# Patient Record
Sex: MALE | Race: WHITE | NOT HISPANIC OR LATINO | Employment: OTHER | URBAN - METROPOLITAN AREA
[De-identification: names, ages, dates, MRNs, and addresses within clinical notes are randomized per-mention and may not be internally consistent; named-entity substitution may affect disease eponyms.]

---

## 2024-06-05 ENCOUNTER — OFFICE VISIT (OUTPATIENT)
Dept: MEDICAL GROUP | Facility: MEDICAL CENTER | Age: 64
End: 2024-06-05
Payer: MEDICARE

## 2024-06-05 ENCOUNTER — APPOINTMENT (OUTPATIENT)
Dept: RADIOLOGY | Facility: MEDICAL CENTER | Age: 64
End: 2024-06-05
Attending: PHYSICIAN ASSISTANT
Payer: MEDICARE

## 2024-06-05 VITALS
HEART RATE: 61 BPM | DIASTOLIC BLOOD PRESSURE: 50 MMHG | OXYGEN SATURATION: 94 % | WEIGHT: 267.2 LBS | BODY MASS INDEX: 33.22 KG/M2 | SYSTOLIC BLOOD PRESSURE: 130 MMHG | RESPIRATION RATE: 16 BRPM | TEMPERATURE: 96.9 F | HEIGHT: 75 IN

## 2024-06-05 DIAGNOSIS — Z85.828 HISTORY OF SKIN CANCER: ICD-10-CM

## 2024-06-05 DIAGNOSIS — Z12.5 SCREENING PSA (PROSTATE SPECIFIC ANTIGEN): ICD-10-CM

## 2024-06-05 DIAGNOSIS — R07.81 RIB PAIN ON RIGHT SIDE: ICD-10-CM

## 2024-06-05 DIAGNOSIS — J30.2 SEASONAL ALLERGIES: ICD-10-CM

## 2024-06-05 DIAGNOSIS — R73.09 ELEVATED GLUCOSE: ICD-10-CM

## 2024-06-05 DIAGNOSIS — E78.00 ELEVATED CHOLESTEROL: ICD-10-CM

## 2024-06-05 DIAGNOSIS — I48.20 CHRONIC ATRIAL FIBRILLATION (HCC): ICD-10-CM

## 2024-06-05 PROCEDURE — 3078F DIAST BP <80 MM HG: CPT | Performed by: PHYSICIAN ASSISTANT

## 2024-06-05 PROCEDURE — 71101 X-RAY EXAM UNILAT RIBS/CHEST: CPT | Mod: RT

## 2024-06-05 PROCEDURE — 3075F SYST BP GE 130 - 139MM HG: CPT | Performed by: PHYSICIAN ASSISTANT

## 2024-06-05 PROCEDURE — 99204 OFFICE O/P NEW MOD 45 MIN: CPT | Performed by: PHYSICIAN ASSISTANT

## 2024-06-05 RX ORDER — AZELASTINE 1 MG/ML
2 SPRAY, METERED NASAL 2 TIMES DAILY
COMMUNITY

## 2024-06-05 RX ORDER — APIXABAN 5 MG/1
5 TABLET, FILM COATED ORAL 2 TIMES DAILY
COMMUNITY
Start: 2024-05-09

## 2024-06-05 RX ORDER — AMIODARONE HYDROCHLORIDE 200 MG/1
100 TABLET ORAL
COMMUNITY

## 2024-06-05 RX ORDER — FLUTICASONE PROPIONATE 50 MCG
1 SPRAY, SUSPENSION (ML) NASAL 2 TIMES DAILY
COMMUNITY

## 2024-06-05 RX ORDER — METOPROLOL SUCCINATE 100 MG/1
100 TABLET, EXTENDED RELEASE ORAL 2 TIMES DAILY
COMMUNITY

## 2024-06-05 RX ORDER — AMIODARONE HYDROCHLORIDE 200 MG/1
200 TABLET ORAL
COMMUNITY
Start: 2024-05-10 | End: 2024-06-05

## 2024-06-05 ASSESSMENT — PATIENT HEALTH QUESTIONNAIRE - PHQ9: CLINICAL INTERPRETATION OF PHQ2 SCORE: 0

## 2024-06-05 NOTE — PROGRESS NOTES
Subjective:     History of Present Illness  The patient presents today to establish care.    The patient has been grappling with atrial fibrillation for a duration of 30 years, which has been well-managed with metoprolol. He underwent an ablation procedure in 11/2023, performed by Dr. Chappell, which yielded successful results. A second ablation was planned, however, his heart returned to normal sinus rhythm. His current medication regimen includes metoprolol, taken twice daily, amiodarone with half a tablet thrice weekly on Monday, Wednesday, and Friday. His primary cardiologist, Dr. River, has been renewed by [facility] in [place].    The patient experiences sinusitis approximately every 5 years, which he attributes to a david environment during his recent move. He is currently using Astelin nasal sprays for seasonal allergies.    The patient expresses a desire to consult a dermatologist due to a history of basal cell carcinoma.    The patient reports experiencing back pain, which he describes as a sensation of being struck in the back. He denies any trauma to the area. The pain has shown improvement over the past 2 weeks.    Supplemental Information  He had a colonoscopy in 2023, which revealed a couple of benign polyps. He has been screened for hepatitis C in the past.      Current medicines (including changes today)  Current Outpatient Medications   Medication Sig Dispense Refill    ELIQUIS 5 MG Tab Take 5 mg by mouth 2 times a day.      azelastine (ASTELIN) 137 MCG/SPRAY nasal spray Administer 2 Sprays into affected nostril(S) 2 times a day.      fluticasone (FLONASE) 50 MCG/ACT nasal spray Administer 1 Spray into affected nostril(S) 2 times a day.      metoprolol SR (TOPROL XL) 100 MG TABLET SR 24 HR Take 100 mg by mouth 2 times a day.      amiodarone (CORDARONE) 200 MG Tab Take 100 mg by mouth every day. TID weekly  Indications: Atrial Fibrillation       No current facility-administered medications for  "this visit.     He  has no past medical history on file.    ROS   No shortness of breath, no abdominal pain  Positive ROS as per HPI.  All other systems reviewed and are negative.     Objective:     /50 (BP Location: Left arm, Patient Position: Sitting, BP Cuff Size: Adult)   Pulse 61   Temp 36.1 °C (96.9 °F) (Temporal)   Resp 16   Ht 1.905 m (6' 3\")   Wt 121 kg (267 lb 3.2 oz)   SpO2 94%  Body mass index is 33.4 kg/m².   Physical Exam    Constitutional: Alert, no distress.  Skin: Warm, dry, good turgor, no rashes in visible areas.  Eye: Equal, round and reactive, conjunctiva clear, lids normal.  ENMT: Lips without lesions, good dentition, oropharynx clear.  Neck: Trachea midline, no masses, no thyromegaly.   Psych: Alert and oriented x3, normal affect and mood.      Results          Assessment and Plan:   The following treatment plan was discussed    Assessment & Plan  1. Chronic atrial fibrillation.  The patient's condition remains stable. A referral to cardiology has been made to establish care. The patient is advised to continue with amiodarone 100 mg thrice weekly and Eliquis 5 mg twice daily. Prescriptions have been deferred to cardiology.    2. Seasonal allergies.  The patient is advised to continue with Astelin and Flonase.    3. History of skin cancer.  A referral to dermatology has been made.    4. Acute right-sided rib pain, atraumatic.  An x-ray has been ordered.    5. Screening purposes.  Labs for screening purposes have been ordered. The patient will be contacted with the results.    Follow-up  A follow-up appointment is scheduled for 6 months from now.      ORDERS:  1. Chronic atrial fibrillation (HCC)    - REFERRAL TO CARDIOLOGY    2. Seasonal allergies      3. History of skin cancer    - Referral to Dermatology    4. Rib pain on right side    - [id number] (WITH 1-VIEW CXR) RIGHT; Future    5. Elevated cholesterol    - CBC WITH DIFFERENTIAL; Future  - Comp Metabolic Panel; " Future  - Lipid Profile; Future    6. Elevated glucose    - HEMOGLOBIN A1C; Future    7. Screening PSA (prostate specific antigen)    - PROSTATE SPECIFIC AG SCREENING; Future        Please note that this dictation was created using voice recognition software. I have made every reasonable attempt to correct obvious errors, but I expect that there are errors of grammar and possibly content that I did not discover before finalizing the note.      Attestation      Verbal consent was acquired by the patient to use [email or web address] ambient listening note generation during this visit Yes

## 2024-06-07 ENCOUNTER — TELEPHONE (OUTPATIENT)
Dept: MEDICAL GROUP | Facility: MEDICAL CENTER | Age: 64
End: 2024-06-07
Payer: MEDICARE

## 2024-06-07 NOTE — TELEPHONE ENCOUNTER
Phone Number Called: [phone]    Call outcome: Spoke to patient regarding message below.    Message: Pt was contacted in regards to lab x-ray results. Pt had no questions or concerns.

## 2024-06-07 NOTE — TELEPHONE ENCOUNTER
----- Message from Leoncio Grider P.A.-C. sent at 6/7/2024  6:57 AM PDT -----  Please contact Yunior.  X-ray good.  Will keep an eye on the pain of that side of the rib.  Please contact me with any questions.    Leoncio

## 2024-06-24 ENCOUNTER — HOSPITAL ENCOUNTER (OUTPATIENT)
Dept: LAB | Facility: MEDICAL CENTER | Age: 64
End: 2024-06-24
Attending: PHYSICIAN ASSISTANT
Payer: MEDICARE

## 2024-06-24 DIAGNOSIS — E78.00 ELEVATED CHOLESTEROL: ICD-10-CM

## 2024-06-24 DIAGNOSIS — R73.09 ELEVATED GLUCOSE: ICD-10-CM

## 2024-06-24 DIAGNOSIS — Z12.5 SCREENING PSA (PROSTATE SPECIFIC ANTIGEN): ICD-10-CM

## 2024-06-24 LAB
ALBUMIN SERPL BCP-MCNC: 4.3 G/DL (ref 3.2–4.9)
ALBUMIN/GLOB SERPL: 1.5 G/DL
ALP SERPL-CCNC: 116 U/L (ref 30–99)
ALT SERPL-CCNC: 32 U/L (ref 2–50)
ANION GAP SERPL CALC-SCNC: 11 MMOL/L (ref 7–16)
AST SERPL-CCNC: 42 U/L (ref 12–45)
BASOPHILS # BLD AUTO: 0.7 % (ref 0–1.8)
BASOPHILS # BLD: 0.05 K/UL (ref 0–0.12)
BILIRUB SERPL-MCNC: 1.6 MG/DL (ref 0.1–1.5)
BUN SERPL-MCNC: 11 MG/DL (ref 8–22)
CALCIUM ALBUM COR SERPL-MCNC: 8.9 MG/DL (ref 8.5–10.5)
CALCIUM SERPL-MCNC: 9.1 MG/DL (ref 8.5–10.5)
CHLORIDE SERPL-SCNC: 105 MMOL/L (ref 96–112)
CHOLEST SERPL-MCNC: 195 MG/DL (ref 100–199)
CO2 SERPL-SCNC: 24 MMOL/L (ref 20–33)
CREAT SERPL-MCNC: 0.79 MG/DL (ref 0.5–1.4)
EOSINOPHIL # BLD AUTO: 0.12 K/UL (ref 0–0.51)
EOSINOPHIL NFR BLD: 1.7 % (ref 0–6.9)
ERYTHROCYTE [DISTWIDTH] IN BLOOD BY AUTOMATED COUNT: 44.4 FL (ref 35.9–50)
EST. AVERAGE GLUCOSE BLD GHB EST-MCNC: 103 MG/DL
GFR SERPLBLD CREATININE-BSD FMLA CKD-EPI: 98 ML/MIN/1.73 M 2
GLOBULIN SER CALC-MCNC: 2.9 G/DL (ref 1.9–3.5)
GLUCOSE SERPL-MCNC: 101 MG/DL (ref 65–99)
HBA1C MFR BLD: 5.2 % (ref 4–5.6)
HCT VFR BLD AUTO: 48.8 % (ref 42–52)
HDLC SERPL-MCNC: 46 MG/DL
HGB BLD-MCNC: 16.4 G/DL (ref 14–18)
IMM GRANULOCYTES # BLD AUTO: 0.02 K/UL (ref 0–0.11)
IMM GRANULOCYTES NFR BLD AUTO: 0.3 % (ref 0–0.9)
LDLC SERPL CALC-MCNC: 120 MG/DL
LYMPHOCYTES # BLD AUTO: 2.12 K/UL (ref 1–4.8)
LYMPHOCYTES NFR BLD: 30.6 % (ref 22–41)
MCH RBC QN AUTO: 32.2 PG (ref 27–33)
MCHC RBC AUTO-ENTMCNC: 33.6 G/DL (ref 32.3–36.5)
MCV RBC AUTO: 95.7 FL (ref 81.4–97.8)
MONOCYTES # BLD AUTO: 0.68 K/UL (ref 0–0.85)
MONOCYTES NFR BLD AUTO: 9.8 % (ref 0–13.4)
NEUTROPHILS # BLD AUTO: 3.94 K/UL (ref 1.82–7.42)
NEUTROPHILS NFR BLD: 56.9 % (ref 44–72)
NRBC # BLD AUTO: 0 K/UL
NRBC BLD-RTO: 0 /100 WBC (ref 0–0.2)
PLATELET # BLD AUTO: 155 K/UL (ref 164–446)
PMV BLD AUTO: 10.4 FL (ref 9–12.9)
POTASSIUM SERPL-SCNC: 4.5 MMOL/L (ref 3.6–5.5)
PROT SERPL-MCNC: 7.2 G/DL (ref 6–8.2)
PSA SERPL-MCNC: 0.21 NG/ML (ref 0–4)
RBC # BLD AUTO: 5.1 M/UL (ref 4.7–6.1)
SODIUM SERPL-SCNC: 140 MMOL/L (ref 135–145)
TRIGL SERPL-MCNC: 147 MG/DL (ref 0–149)
WBC # BLD AUTO: 6.9 K/UL (ref 4.8–10.8)

## 2024-06-24 PROCEDURE — 36415 COLL VENOUS BLD VENIPUNCTURE: CPT

## 2024-06-24 PROCEDURE — 80053 COMPREHEN METABOLIC PANEL: CPT

## 2024-06-24 PROCEDURE — 83036 HEMOGLOBIN GLYCOSYLATED A1C: CPT | Mod: GA

## 2024-06-24 PROCEDURE — 85025 COMPLETE CBC W/AUTO DIFF WBC: CPT

## 2024-06-24 PROCEDURE — 84153 ASSAY OF PSA TOTAL: CPT | Mod: GA

## 2024-06-24 PROCEDURE — 80061 LIPID PANEL: CPT

## 2024-06-25 ENCOUNTER — APPOINTMENT (OUTPATIENT)
Dept: RADIOLOGY | Facility: MEDICAL CENTER | Age: 64
DRG: 281 | End: 2024-06-25
Attending: EMERGENCY MEDICINE
Payer: MEDICARE

## 2024-06-25 ENCOUNTER — APPOINTMENT (OUTPATIENT)
Dept: CARDIOLOGY | Facility: MEDICAL CENTER | Age: 64
DRG: 281 | End: 2024-06-25
Attending: EMERGENCY MEDICINE
Payer: MEDICARE

## 2024-06-25 ENCOUNTER — OFFICE VISIT (OUTPATIENT)
Dept: CARDIOLOGY | Facility: MEDICAL CENTER | Age: 64
End: 2024-06-25
Attending: PHYSICIAN ASSISTANT
Payer: MEDICARE

## 2024-06-25 ENCOUNTER — HOSPITAL ENCOUNTER (INPATIENT)
Facility: MEDICAL CENTER | Age: 64
LOS: 1 days | DRG: 281 | End: 2024-06-26
Attending: EMERGENCY MEDICINE | Admitting: HOSPITALIST
Payer: MEDICARE

## 2024-06-25 VITALS
BODY MASS INDEX: 33.57 KG/M2 | HEIGHT: 75 IN | WEIGHT: 270 LBS | HEART RATE: 134 BPM | DIASTOLIC BLOOD PRESSURE: 68 MMHG | RESPIRATION RATE: 14 BRPM | SYSTOLIC BLOOD PRESSURE: 112 MMHG | OXYGEN SATURATION: 100 %

## 2024-06-25 DIAGNOSIS — E78.00 PURE HYPERCHOLESTEROLEMIA: ICD-10-CM

## 2024-06-25 DIAGNOSIS — I48.0 PAROXYSMAL ATRIAL FIBRILLATION (HCC): ICD-10-CM

## 2024-06-25 DIAGNOSIS — I48.0 HYPERCOAGULABLE STATE DUE TO PAROXYSMAL ATRIAL FIBRILLATION (HCC): ICD-10-CM

## 2024-06-25 DIAGNOSIS — D68.69 HYPERCOAGULABLE STATE DUE TO PAROXYSMAL ATRIAL FIBRILLATION (HCC): ICD-10-CM

## 2024-06-25 DIAGNOSIS — I10 HTN (HYPERTENSION), MALIGNANT: ICD-10-CM

## 2024-06-25 DIAGNOSIS — I48.91 ATRIAL FIBRILLATION WITH RVR (HCC): ICD-10-CM

## 2024-06-25 DIAGNOSIS — R07.89 OTHER CHEST PAIN: ICD-10-CM

## 2024-06-25 DIAGNOSIS — R79.89 ELEVATED TROPONIN: ICD-10-CM

## 2024-06-25 DIAGNOSIS — I47.10 SVT (SUPRAVENTRICULAR TACHYCARDIA) (HCC): ICD-10-CM

## 2024-06-25 PROBLEM — R94.31 ABNORMAL EKG: Status: ACTIVE | Noted: 2024-06-25

## 2024-06-25 LAB
ALBUMIN SERPL BCP-MCNC: 4.2 G/DL (ref 3.2–4.9)
ALBUMIN/GLOB SERPL: 1.4 G/DL
ALP SERPL-CCNC: 115 U/L (ref 30–99)
ALT SERPL-CCNC: 29 U/L (ref 2–50)
ANION GAP SERPL CALC-SCNC: 13 MMOL/L (ref 7–16)
APTT PPP: 33.8 SEC (ref 24.7–36)
AST SERPL-CCNC: 40 U/L (ref 12–45)
BASOPHILS # BLD AUTO: 0.6 % (ref 0–1.8)
BASOPHILS # BLD: 0.05 K/UL (ref 0–0.12)
BILIRUB SERPL-MCNC: 1.1 MG/DL (ref 0.1–1.5)
BUN SERPL-MCNC: 11 MG/DL (ref 8–22)
CALCIUM ALBUM COR SERPL-MCNC: 9.3 MG/DL (ref 8.5–10.5)
CALCIUM SERPL-MCNC: 9.5 MG/DL (ref 8.5–10.5)
CHLORIDE SERPL-SCNC: 106 MMOL/L (ref 96–112)
CO2 SERPL-SCNC: 20 MMOL/L (ref 20–33)
CREAT SERPL-MCNC: 0.76 MG/DL (ref 0.5–1.4)
EKG IMPRESSION: NORMAL
EOSINOPHIL # BLD AUTO: 0.11 K/UL (ref 0–0.51)
EOSINOPHIL NFR BLD: 1.4 % (ref 0–6.9)
ERYTHROCYTE [DISTWIDTH] IN BLOOD BY AUTOMATED COUNT: 43.8 FL (ref 35.9–50)
GFR SERPLBLD CREATININE-BSD FMLA CKD-EPI: 100 ML/MIN/1.73 M 2
GLOBULIN SER CALC-MCNC: 2.9 G/DL (ref 1.9–3.5)
GLUCOSE SERPL-MCNC: 83 MG/DL (ref 65–99)
HCT VFR BLD AUTO: 46.9 % (ref 42–52)
HGB BLD-MCNC: 16.6 G/DL (ref 14–18)
IMM GRANULOCYTES # BLD AUTO: 0.03 K/UL (ref 0–0.11)
IMM GRANULOCYTES NFR BLD AUTO: 0.4 % (ref 0–0.9)
INR PPP: 1.33 (ref 0.87–1.13)
LV EJECT FRACT  99904: 60
LV EJECT FRACT MOD 2C 99903: 52.02
LV EJECT FRACT MOD 4C 99902: 46.7
LV EJECT FRACT MOD BP 99901: 51.75
LYMPHOCYTES # BLD AUTO: 2.5 K/UL (ref 1–4.8)
LYMPHOCYTES NFR BLD: 31.1 % (ref 22–41)
MAGNESIUM SERPL-MCNC: 2.1 MG/DL (ref 1.5–2.5)
MCH RBC QN AUTO: 32.8 PG (ref 27–33)
MCHC RBC AUTO-ENTMCNC: 35.4 G/DL (ref 32.3–36.5)
MCV RBC AUTO: 92.7 FL (ref 81.4–97.8)
MONOCYTES # BLD AUTO: 0.9 K/UL (ref 0–0.85)
MONOCYTES NFR BLD AUTO: 11.2 % (ref 0–13.4)
NEUTROPHILS # BLD AUTO: 4.44 K/UL (ref 1.82–7.42)
NEUTROPHILS NFR BLD: 55.3 % (ref 44–72)
NRBC # BLD AUTO: 0 K/UL
NRBC BLD-RTO: 0 /100 WBC (ref 0–0.2)
PHOSPHATE SERPL-MCNC: 2.8 MG/DL (ref 2.5–4.5)
PLATELET # BLD AUTO: 150 K/UL (ref 164–446)
PMV BLD AUTO: 10 FL (ref 9–12.9)
POTASSIUM SERPL-SCNC: 4.1 MMOL/L (ref 3.6–5.5)
PROT SERPL-MCNC: 7.1 G/DL (ref 6–8.2)
PROTHROMBIN TIME: 16.6 SEC (ref 12–14.6)
RBC # BLD AUTO: 5.06 M/UL (ref 4.7–6.1)
SODIUM SERPL-SCNC: 139 MMOL/L (ref 135–145)
TROPONIN T SERPL-MCNC: 134 NG/L (ref 6–19)
TROPONIN T SERPL-MCNC: 146 NG/L (ref 6–19)
TROPONIN T SERPL-MCNC: 152 NG/L (ref 6–19)
TSH SERPL DL<=0.005 MIU/L-ACNC: 2.54 UIU/ML (ref 0.38–5.33)
WBC # BLD AUTO: 8 K/UL (ref 4.8–10.8)

## 2024-06-25 PROCEDURE — 99204 OFFICE O/P NEW MOD 45 MIN: CPT | Mod: 25 | Performed by: INTERNAL MEDICINE

## 2024-06-25 PROCEDURE — 3074F SYST BP LT 130 MM HG: CPT | Performed by: INTERNAL MEDICINE

## 2024-06-25 PROCEDURE — 83735 ASSAY OF MAGNESIUM: CPT

## 2024-06-25 PROCEDURE — A9270 NON-COVERED ITEM OR SERVICE: HCPCS | Performed by: HOSPITALIST

## 2024-06-25 PROCEDURE — 71045 X-RAY EXAM CHEST 1 VIEW: CPT

## 2024-06-25 PROCEDURE — 96375 TX/PRO/DX INJ NEW DRUG ADDON: CPT

## 2024-06-25 PROCEDURE — 700111 HCHG RX REV CODE 636 W/ 250 OVERRIDE (IP): Performed by: EMERGENCY MEDICINE

## 2024-06-25 PROCEDURE — 99223 1ST HOSP IP/OBS HIGH 75: CPT | Performed by: INTERNAL MEDICINE

## 2024-06-25 PROCEDURE — 700117 HCHG RX CONTRAST REV CODE 255: Performed by: EMERGENCY MEDICINE

## 2024-06-25 PROCEDURE — 93010 ELECTROCARDIOGRAM REPORT: CPT | Performed by: INTERNAL MEDICINE

## 2024-06-25 PROCEDURE — 93306 TTE W/DOPPLER COMPLETE: CPT

## 2024-06-25 PROCEDURE — 700111 HCHG RX REV CODE 636 W/ 250 OVERRIDE (IP): Mod: JZ | Performed by: HOSPITALIST

## 2024-06-25 PROCEDURE — 93005 ELECTROCARDIOGRAM TRACING: CPT | Performed by: EMERGENCY MEDICINE

## 2024-06-25 PROCEDURE — 36415 COLL VENOUS BLD VENIPUNCTURE: CPT

## 2024-06-25 PROCEDURE — 85610 PROTHROMBIN TIME: CPT

## 2024-06-25 PROCEDURE — 93306 TTE W/DOPPLER COMPLETE: CPT | Mod: 26 | Performed by: INTERNAL MEDICINE

## 2024-06-25 PROCEDURE — 84443 ASSAY THYROID STIM HORMONE: CPT

## 2024-06-25 PROCEDURE — 3078F DIAST BP <80 MM HG: CPT | Performed by: INTERNAL MEDICINE

## 2024-06-25 PROCEDURE — 700102 HCHG RX REV CODE 250 W/ 637 OVERRIDE(OP): Performed by: HOSPITALIST

## 2024-06-25 PROCEDURE — 99211 OFF/OP EST MAY X REQ PHY/QHP: CPT | Performed by: INTERNAL MEDICINE

## 2024-06-25 PROCEDURE — 84484 ASSAY OF TROPONIN QUANT: CPT

## 2024-06-25 PROCEDURE — 770020 HCHG ROOM/CARE - TELE (206)

## 2024-06-25 PROCEDURE — 99285 EMERGENCY DEPT VISIT HI MDM: CPT

## 2024-06-25 PROCEDURE — 80053 COMPREHEN METABOLIC PANEL: CPT

## 2024-06-25 PROCEDURE — 85025 COMPLETE CBC W/AUTO DIFF WBC: CPT

## 2024-06-25 PROCEDURE — 93005 ELECTROCARDIOGRAM TRACING: CPT | Performed by: INTERNAL MEDICINE

## 2024-06-25 PROCEDURE — 84100 ASSAY OF PHOSPHORUS: CPT

## 2024-06-25 PROCEDURE — 93005 ELECTROCARDIOGRAM TRACING: CPT

## 2024-06-25 PROCEDURE — 96374 THER/PROPH/DIAG INJ IV PUSH: CPT

## 2024-06-25 PROCEDURE — 99291 CRITICAL CARE FIRST HOUR: CPT | Performed by: HOSPITALIST

## 2024-06-25 PROCEDURE — 700105 HCHG RX REV CODE 258: Performed by: HOSPITALIST

## 2024-06-25 PROCEDURE — 700111 HCHG RX REV CODE 636 W/ 250 OVERRIDE (IP)

## 2024-06-25 PROCEDURE — 85730 THROMBOPLASTIN TIME PARTIAL: CPT

## 2024-06-25 RX ORDER — AMOXICILLIN 250 MG
2 CAPSULE ORAL 2 TIMES DAILY
Status: DISCONTINUED | OUTPATIENT
Start: 2024-06-25 | End: 2024-06-26 | Stop reason: HOSPADM

## 2024-06-25 RX ORDER — HYDROMORPHONE HYDROCHLORIDE 1 MG/ML
0.25 INJECTION, SOLUTION INTRAMUSCULAR; INTRAVENOUS; SUBCUTANEOUS
Status: DISCONTINUED | OUTPATIENT
Start: 2024-06-25 | End: 2024-06-26 | Stop reason: HOSPADM

## 2024-06-25 RX ORDER — OXYCODONE HYDROCHLORIDE 5 MG/1
2.5 TABLET ORAL
Status: DISCONTINUED | OUTPATIENT
Start: 2024-06-25 | End: 2024-06-26 | Stop reason: HOSPADM

## 2024-06-25 RX ORDER — POLYETHYLENE GLYCOL 3350 17 G/17G
1 POWDER, FOR SOLUTION ORAL
Status: DISCONTINUED | OUTPATIENT
Start: 2024-06-25 | End: 2024-06-26 | Stop reason: HOSPADM

## 2024-06-25 RX ORDER — METOPROLOL SUCCINATE 50 MG/1
100 TABLET, EXTENDED RELEASE ORAL 2 TIMES DAILY
Status: DISCONTINUED | OUTPATIENT
Start: 2024-06-25 | End: 2024-06-25

## 2024-06-25 RX ORDER — AMIODARONE HYDROCHLORIDE 200 MG/1
100 TABLET ORAL
Status: DISCONTINUED | OUTPATIENT
Start: 2024-06-26 | End: 2024-06-26 | Stop reason: HOSPADM

## 2024-06-25 RX ORDER — DILTIAZEM HYDROCHLORIDE 5 MG/ML
10 INJECTION INTRAVENOUS ONCE
Status: COMPLETED | OUTPATIENT
Start: 2024-06-25 | End: 2024-06-25

## 2024-06-25 RX ORDER — ACETAMINOPHEN 325 MG/1
650 TABLET ORAL EVERY 6 HOURS PRN
Status: DISCONTINUED | OUTPATIENT
Start: 2024-06-25 | End: 2024-06-26 | Stop reason: HOSPADM

## 2024-06-25 RX ORDER — OXYCODONE HYDROCHLORIDE 5 MG/1
5 TABLET ORAL
Status: DISCONTINUED | OUTPATIENT
Start: 2024-06-25 | End: 2024-06-26 | Stop reason: HOSPADM

## 2024-06-25 RX ORDER — METOPROLOL SUCCINATE 50 MG/1
100 TABLET, EXTENDED RELEASE ORAL 2 TIMES DAILY
Status: DISCONTINUED | OUTPATIENT
Start: 2024-06-25 | End: 2024-06-26 | Stop reason: HOSPADM

## 2024-06-25 RX ADMIN — HUMAN ALBUMIN MICROSPHERES AND PERFLUTREN 3 ML: 10; .22 INJECTION, SOLUTION INTRAVENOUS at 15:33

## 2024-06-25 RX ADMIN — APIXABAN 5 MG: 5 TABLET, FILM COATED ORAL at 17:44

## 2024-06-25 RX ADMIN — DILTIAZEM HYDROCHLORIDE 10 MG/HR: 5 INJECTION INTRAVENOUS at 17:38

## 2024-06-25 RX ADMIN — DILTIAZEM HYDROCHLORIDE 10 MG: 5 INJECTION, SOLUTION INTRAVENOUS at 15:46

## 2024-06-25 RX ADMIN — METOPROLOL SUCCINATE 100 MG: 50 TABLET, EXTENDED RELEASE ORAL at 18:36

## 2024-06-25 RX ADMIN — DILTIAZEM HYDROCHLORIDE 10 MG: 5 INJECTION, SOLUTION INTRAVENOUS at 14:20

## 2024-06-25 ASSESSMENT — HEART SCORE
TROPONIN: 1-3 TIMES NORMAL LIMIT
HEART SCORE: 6
HISTORY: SLIGHTLY SUSPICIOUS
AGE: 65+
ECG: SIGNIFICANT ST-DEPRESSION
RISK FACTORS: 1-2 RISK FACTORS

## 2024-06-25 ASSESSMENT — ENCOUNTER SYMPTOMS
BLOOD IN STOOL: 0
BRUISES/BLEEDS EASILY: 0
WEIGHT LOSS: 0
PND: 0
FEVER: 0
DEPRESSION: 0
STRIDOR: 0
COUGH: 0
NAUSEA: 0
HALLUCINATIONS: 0
BLURRED VISION: 0
HEADACHES: 0
SHORTNESS OF BREATH: 0
SPEECH CHANGE: 0
FOCAL WEAKNESS: 0
CHILLS: 0
EYE PAIN: 0
CHILLS: 0
PALPITATIONS: 1
ABDOMINAL PAIN: 0
DIZZINESS: 0
EYE REDNESS: 0
EYE DISCHARGE: 0
FALLS: 0
ORTHOPNEA: 0
CLAUDICATION: 0
COUGH: 0
NERVOUS/ANXIOUS: 0
FEVER: 0
SENSORY CHANGE: 0
MYALGIAS: 0
FLANK PAIN: 0
VOMITING: 0
ABDOMINAL PAIN: 0
LOSS OF CONSCIOUSNESS: 0
VOMITING: 0
BRUISES/BLEEDS EASILY: 0
EYE DISCHARGE: 0
SHORTNESS OF BREATH: 0
DOUBLE VISION: 0
MYALGIAS: 0

## 2024-06-25 ASSESSMENT — LIFESTYLE VARIABLES
EVER HAD A DRINK FIRST THING IN THE MORNING TO STEADY YOUR NERVES TO GET RID OF A HANGOVER: NO
HOW MANY TIMES IN THE PAST YEAR HAVE YOU HAD 5 OR MORE DRINKS IN A DAY: 0
AVERAGE NUMBER OF DAYS PER WEEK YOU HAVE A DRINK CONTAINING ALCOHOL: 2
DOES PATIENT WANT TO STOP DRINKING: NO
HAVE YOU EVER FELT YOU SHOULD CUT DOWN ON YOUR DRINKING: NO
CONSUMPTION TOTAL: NEGATIVE
EVER FELT BAD OR GUILTY ABOUT YOUR DRINKING: NO
ON A TYPICAL DAY WHEN YOU DRINK ALCOHOL HOW MANY DRINKS DO YOU HAVE: 1
TOTAL SCORE: 0
TOTAL SCORE: 0
HAVE PEOPLE ANNOYED YOU BY CRITICIZING YOUR DRINKING: NO
TOTAL SCORE: 0
ALCOHOL_USE: YES

## 2024-06-25 ASSESSMENT — CHA2DS2 SCORE
AGE 65 TO 74: YES
CHF OR LEFT VENTRICULAR DYSFUNCTION: NO
VASCULAR DISEASE: YES
HYPERTENSION: YES
AGE 75 OR GREATER: NO
SEX: MALE
PRIOR STROKE OR TIA OR THROMBOEMBOLISM: NO
CHA2DS2 VASC SCORE: 3
DIABETES: NO

## 2024-06-25 ASSESSMENT — PATIENT HEALTH QUESTIONNAIRE - PHQ9
2. FEELING DOWN, DEPRESSED, IRRITABLE, OR HOPELESS: NOT AT ALL
1. LITTLE INTEREST OR PLEASURE IN DOING THINGS: NOT AT ALL
SUM OF ALL RESPONSES TO PHQ9 QUESTIONS 1 AND 2: 0

## 2024-06-25 ASSESSMENT — FIBROSIS 4 INDEX
FIB4 SCORE: 3.22
FIB4 SCORE: 3.11
FIB4 SCORE: 3.11

## 2024-06-25 ASSESSMENT — PAIN DESCRIPTION - PAIN TYPE: TYPE: ACUTE PAIN

## 2024-06-25 NOTE — ED PROVIDER NOTES
ED Provider Note    CHIEF COMPLAINT  Chief Complaint   Patient presents with    Sent by MD     Pt was establishing a new cardiologist today when he was noted to be tachycardic and sent here for further evaluation. Pt endorses history of prior ablations. Pt currently on eliquis, amiodarone and metoprolol. Denies chest pain or shortness of breath.  Pt endorses some fatigue.        EXTERNAL RECORDS REVIEWED  Outpatient Notes was sent here from the cardiology office today with a history of chronic atrial fibrillation and was found to be in SVT he has had sporadic palpitations without any precipitating symptoms and feels like his heart is being pulled down he has a history of atrial fibrillation status post ablation November 2023 his EKG showed SVT with a rate of 135 and he was sent to the emergency department for rate control and further evaluation.    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    Yunior Ambriz is a 64 y.o. male who presents complaining of tachycardia and irregular heart rate.  The patient has a history of atrial fibrillation and had an ablation in [place] November 2023.  He is currently taking metoprolol and amiodarone as well as Eliquis.  He admits to a few glasses of wine nightly.  He has not missed any of his medications.  He denies smoking.  He denies any current chest pain or shortness of breath.  The patient states he did exercise this morning and had some caffeine.  He denies any leg swelling PND orthopnea.  He states he has never had a heart attack and has no stents.    PAST MEDICAL HISTORY       SURGICAL HISTORY  patient denies any surgical history    FAMILY HISTORY  History reviewed. No pertinent family history.    SOCIAL HISTORY  Social History     Tobacco Use    Smoking status: Never    Smokeless tobacco: Never   Vaping Use     Vaping status: Never Used   Substance and Sexual Activity    Alcohol use: Yes     Alcohol/week: 8.4 oz     Types: 14 Glasses of wine per week    Drug use: Never    Sexual activity: Not on file       CURRENT MEDICATIONS  Home Medications       Reviewed by Abraham Cervantes (Pharmacy Tech) on 06/25/24 at 1539  Med List Status: Complete     Medication Last Dose Status   amiodarone (CORDARONE) 200 MG Tab 6/24/2024 Active   azelastine (ASTELIN) 137 MCG/SPRAY nasal spray 6/25/2024 Active   ELIQUIS 5 MG Tab 6/25/2024 Active   fluticasone (FLONASE) 50 MCG/ACT nasal spray 6/25/2024 Active   metoprolol SR (TOPROL XL) 100 MG TABLET SR 24 HR 6/25/2024 Active                  Audit from Redirected Encounters    **Home medications have not yet been reviewed for this encounter**         ALLERGIES  No Known Allergies    PHYSICAL EXAM  VITAL SIGNS: BP (!) 126/94   Pulse (!) 132   Temp 36.3 °C (97.3 °F) (Temporal)   Resp 16   SpO2 94%      Constitutional: Well developed, Well nourished, No acute distress, Non-toxic appearance.   HEENT: Normocephalic, Atraumatic,  external ears normal, pharynx pink,  Mucous  Membranes moist, No rhinorrhea or mucosal edema  Eyes: PERRL, EOMI, Conjunctiva normal, No discharge.   Neck: Normal range of motion, No tenderness, Supple, No stridor.   Lymphatic: No lymphadenopathy    Cardiovascular: Tachycardic, irregularly irregular rate and rhythm no murmurs,  rubs, or gallops.   Thorax & Lungs: Lungs clear to auscultation bilaterally, No respiratory distress, No wheezes, rhales or rhonchi, No chest wall tenderness.   Abdomen: Bowel sounds normal, Soft, non tender, non distended,  No pulsatile masses., no rebound guarding or peritoneal signs.   Skin: Warm, Dry, No erythema, No rash,   Back:  No CVA tenderness,  No spinal tenderness, bony crepitance step offs or instability.   Extremities: Equal, intact distal pulses, No cyanosis, clubbing or edema,  No tenderness.   Musculoskeletal: Good range of motion  in all major joints. No tenderness to palpation or major deformities noted.   Neurologic: Alert & oriented No focal deficits noted.  Psychiatric: Affect normal, Judgment normal, Mood normal.      EKG/LABS  Results for orders placed or performed during the hospital encounter of 06/25/24   EC-ECHOCARDIOGRAM COMPLETE W/ CONT   Result Value Ref Range    Eject.Frac. MOD BP 51.75     Eject.Frac. MOD 4C 46.7     Eject.Frac. MOD 2C 52.02     Left Ventrical Ejection Fraction 60    CBC with Differential   Result Value Ref Range    WBC 8.0 4.8 - 10.8 K/uL    RBC 5.06 4.70 - 6.10 M/uL    Hemoglobin 16.6 14.0 - 18.0 g/dL    Hematocrit 46.9 42.0 - 52.0 %    MCV 92.7 81.4 - 97.8 fL    MCH 32.8 27.0 - 33.0 pg    MCHC 35.4 32.3 - 36.5 g/dL    RDW 43.8 35.9 - 50.0 fL    Platelet Count 150 (L) 164 - 446 K/uL    MPV 10.0 9.0 - 12.9 fL    Neutrophils-Polys 55.30 44.00 - 72.00 %    Lymphocytes 31.10 22.00 - 41.00 %    Monocytes 11.20 0.00 - 13.40 %    Eosinophils 1.40 0.00 - 6.90 %    Basophils 0.60 0.00 - 1.80 %    Immature Granulocytes 0.40 0.00 - 0.90 %    Nucleated RBC 0.00 0.00 - 0.20 /100 WBC    Neutrophils (Absolute) 4.44 1.82 - 7.42 K/uL    Lymphs (Absolute) 2.50 1.00 - 4.80 K/uL    Monos (Absolute) 0.90 (H) 0.00 - 0.85 K/uL    Eos (Absolute) 0.11 0.00 - 0.51 K/uL    Baso (Absolute) 0.05 0.00 - 0.12 K/uL    Immature Granulocytes (abs) 0.03 0.00 - 0.11 K/uL    NRBC (Absolute) 0.00 K/uL   Complete Metabolic Panel (CMP)   Result Value Ref Range    Sodium 139 135 - 145 mmol/L    Potassium 4.1 3.6 - 5.5 mmol/L    Chloride 106 96 - 112 mmol/L    Co2 20 20 - 33 mmol/L    Anion Gap 13.0 7.0 - 16.0    Glucose 83 65 - 99 mg/dL    Bun 11 8 - 22 mg/dL    Creatinine 0.76 0.50 - 1.40 mg/dL    Calcium 9.5 8.5 - 10.5 mg/dL    Correct Calcium 9.3 8.5 - 10.5 mg/dL    AST(SGOT) 40 12 - 45 U/L    ALT(SGPT) 29 2 - 50 U/L    Alkaline Phosphatase 115 (H) 30 - 99 U/L    Total Bilirubin 1.1 0.1 - 1.5 mg/dL    Albumin 4.2 3.2 - 4.9 g/dL    Total Protein  7.1 6.0 - 8.2 g/dL    Globulin 2.9 1.9 - 3.5 g/dL    A-G Ratio 1.4 g/dL   Troponin STAT   Result Value Ref Range    Troponin T 134 (H) 6 - 19 ng/L   Magnesium   Result Value Ref Range    Magnesium 2.1 1.5 - 2.5 mg/dL   Phosphorus   Result Value Ref Range    Phosphorus 2.8 2.5 - 4.5 mg/dL   TSH (for screening thyroid dysfunction)   Result Value Ref Range    TSH 2.540 0.380 - 5.330 uIU/mL   Prothrombin Time   Result Value Ref Range    PT 16.6 (H) 12.0 - 14.6 sec    INR 1.33 (H) 0.87 - 1.13   APTT   Result Value Ref Range    APTT 33.8 24.7 - 36.0 sec   ESTIMATED GFR   Result Value Ref Range    GFR (CKD-EPI) 100 >60 mL/min/1.73 m 2   EKG   Result Value Ref Range    Report       [facility].    Test Date:  2024  Pt Name:    JAMIE GOINS                 Department: ER  MRN:        [id number]                      Room:  Gender:     Male                         Technician: [id number]  :        1960                   Requested By:ER TRIAGE PROTOCOL  Order #:    [id number]                    Reading MD: JANICE PEGUERO MD    Measurements  Intervals                                Axis  Rate:       133                          P:          254  NM:         256                          QRS:        65  QRSD:       103                          T:          244  QT:         391  QTc:        582    Interpretive Statements  Sinus or ectopic atrial tachycardia  Prolonged NM interval  Consider left atrial enlargement  Repol abnrm, prob ischemia, inferolateral lds  Prolonged QT interval  Compared to ECG 2024 12:48:42  First degree AV block now present  Early repolarization now present  Possible ischemia now present  Supraventricular t achycardia no longer present  Electronically Signed On 2024 13:56:18 PDT by JANICE PEGUERO MD        I have independently interpreted this EKG    RADIOLOGY/PROCEDURES   I have independently interpreted the diagnostic imaging associated with this visit and am  waiting the final reading from the radiologist.   My preliminary interpretation is as follows: Chest x-ray no infiltrate or pleural effusion    Radiologist interpretation:  EC-ECHOCARDIOGRAM COMPLETE W/ CONT   Final Result      DX-CHEST-PORTABLE (1 VIEW)   Final Result      No acute cardiac or pulmonary abnormalities are identified.          COURSE & MEDICAL DECISION MAKING    ASSESSMENT, COURSE AND PLAN  Care Narrative: Yunior Ambriz is a 64 y.o. male who presents complaining of tachycardia and irregular heart rate.  The patient has a history of atrial fibrillation and had an ablation in [place] November 2023.  He is currently taking metoprolol and amiodarone as well as Eliquis.  He admits to a few glasses of wine nightly.  He has not missed any of his medications.  He denies smoking.  He denies any current chest pain or shortness of breath.  The patient states he did exercise this morning and had some caffeine.  He denies any leg swelling PND orthopnea.  He states he has never had a heart attack and has no stents.    Heart score: 6        ADDITIONAL PROBLEMS MANAGED  History of atrial fibrillation and SVT    DISPOSITION AND DISCUSSIONS    I gave the patient IV diltiazem and violette blood work and ordered a chest x-ray to further evaluate his symptoms.    Patient's white count is normal at 8 hemoglobin stable at 16.6 platelet count slightly low at 150 with a normal differential.  Comprehensive metabolic panel is unremarkable with normal electrolytes normal glucose normal kidney function and normal liver function test alk phos is slightly evaded at 115.  The patient's EKG shows SVT at a rate of 135.  I gave him 10 mg of IV diltiazem and his heart rate went down to 120 but he is still tachycardic.  His troponin is slightly elevated at 134.  TSH is normal at 2.5.  His chest x-ray shows no infiltrate or pleural effusion.    In cardiology's note they mention doing an echo and a stress test.  I did order the echo in  the emergency department and results are pending.  Since the patient's troponin is elevated I believe he needs to be admitted for further cardiac workup.  I will admit him to the hospitalist and cardiology will follow with him.  Patient understands he will be admitted at this time for further workup and care.    I spoke with cardiology Dr. Sprague who recommends a diltiazem drip if the second dose of diltiazem does not slow his heart rate significantly.    I spoke with the hospitalist Dr. Mercado who accepts the patient for admission.  Upon recheck the patient is currently resting comfortably without shortness of breath and he is chest pain-free.  He understands his need to stay for further workup.    I have discussed management of the patient with the following physicians and TERRY's: Hospitalist Dr. Mercado  Cardiology Dr Sprague    Discussion of management with other [facility] or appropriate source(s): None     Escalation of care considered, and ultimately not performed:none    Barriers to care at this time, including but not limited to: none.     Decision tools and prescription drugs considered including, but not limited to:  iv diltiazem .      Patient will be admitted in guarded condition.    FINAL DIAGNOSIS  1. Atrial fibrillation with RVR (HCC)    2. Elevated troponin           Electronically signed by: Deborah Allen M.D., 6/25/2024 1:53 PM

## 2024-06-25 NOTE — CONSULTS
Cardiology Initial Consult Note    Reason for Consult:  Asked by Dr Vargas Mercado M.D. to see this patient with AF with RVR  Patient's PCP: Leoncio Grider P.A.-C.    CC:   Chief Complaint   Patient presents with    Sent by MD     Pt was establishing a new cardiologist today when he was noted to be tachycardic and sent here for further evaluation. Pt endorses history of prior ablations. Pt currently on eliquis, amiodarone and metoprolol. Denies chest pain or shortness of breath.  Pt endorses some fatigue.        HPI:   This is a 64 year old man with PMH significant for PAF s/p ablation in 2023 and hx of cardioversion currently on amiodarone and Eliquis who was sent to the ED by his cardiologist due to symptomatic tachycardia/SVT.    He was just seen by Dr. Olguin to establish care. During office visit, EKG was performed and showed narrow complex tachycardia concerning for SVT. He was instructed to come the ED due to symptoms. Was found to have AF with RVR. Given IV AVN blocker for rate control. Started on diltiazem gtt.     According to the patient, he has had AF for many years. In 2023 he underwent AF ablation in [place]. His primary cardiologist prior to moving to [place], [place] was Dr. River from [facility].    He states that he has no chest pain or dyspnea. Has only been experiencing fatigue and palpitations. Has been compliant with Eliquis 5mg BID without any missed doses. Has been taking it as prescribed--no missed doses over the past 2 months. Last dose was this morning.    Medications / Drug list prior to admission:  No current facility-administered medications on file prior to encounter.     Current Outpatient Medications on File Prior to Encounter   Medication Sig Dispense Refill    ELIQUIS 5 MG Tab Take 5 mg by mouth 2 times a day.      azelastine (ASTELIN) 137 MCG/SPRAY nasal spray Administer 2 Sprays into affected nostril(S) 2 times a day.      fluticasone (FLONASE) 50 MCG/ACT  nasal spray Administer 1 Spray into affected nostril(S) 2 times a day.      metoprolol SR (TOPROL XL) 100 MG TABLET SR 24 HR Take 100 mg by mouth 2 times a day.      amiodarone (CORDARONE) 200 MG Tab Take 100 mg by mouth every Monday, Wednesday, and Friday. .5 tab = 100 mg  Indications: Atrial Fibrillation         Current list of administered Medications:    Current Facility-Administered Medications:     dilTIAZem (Cardizem) 100 mg in dextrose 5% 100 mL Infusion, 10 mg/hr, Intravenous, Continuous, Vargas Mercado M.D.    dilTIAZem (Cardizem) tablet 30 mg, 30 mg, Oral, Q6HRS, Vargas Mercado M.D.    [START ON 6/26/2024] amiodarone (Cordarone) tablet 100 mg, 100 mg, Oral, MO, WE + FR, Vargas Mercado M.D.    apixaban (Eliquis) tablet 5 mg, 5 mg, Oral, BID, Vargas Mercado M.D.    acetaminophen (Tylenol) tablet 650 mg, 650 mg, Oral, Q6HRS PRN, Vargas Mercado M.D.    senna-docusate (Pericolace Or Senokot S) 8.6-50 MG per tablet 2 Tablet, 2 Tablet, Oral, BID **AND** polyethylene glycol/lytes (Miralax) Packet 1 Packet, 1 Packet, Oral, QDAY PRN, Vargas Mercado M.D.    Notify provider if pain remains uncontrolled, , , CONTINUOUS **AND** Use the Numeric Rating Scale (NRS), Terry-Baker Faces (WBF), or FLACC on regular floors and Critical-Care Pain Observation Tool (CPOT) on ICUs/Trauma to assess pain, , , CONTINUOUS **AND** Pulse Ox, , , CONTINUOUS **AND** Pharmacy Consult Request ...Pain Management Review 1 Each, 1 Each, Other, PHARMACY TO DOSE **AND** If patient difficult to arouse and/or has respiratory depression (respiratory rate of 10 or less), stop any opiates that are currently infusing and call a Rapid Response., , , CONTINUOUS, Asem A Mutasher, M.D.    oxyCODONE immediate-release (Roxicodone) tablet 2.5 mg, 2.5 mg, Oral, Q3HRS PRN **OR** oxyCODONE immediate-release (Roxicodone) tablet 5 mg, 5 mg, Oral, Q3HRS PRN **OR** HYDROmorphone (Dilaudid) injection 0.25 mg, 0.25 mg, Intravenous, Q3HRS PRN, Asem A  "JACKELIN Mercado    Current Outpatient Medications:     ELIQUIS 5 MG Tab, Take 5 mg by mouth 2 times a day., Disp: , Rfl:     azelastine (ASTELIN) 137 MCG/SPRAY nasal spray, Administer 2 Sprays into affected nostril(S) 2 times a day., Disp: , Rfl:     fluticasone (FLONASE) 50 MCG/ACT nasal spray, Administer 1 Spray into affected nostril(S) 2 times a day., Disp: , Rfl:     metoprolol SR (TOPROL XL) 100 MG TABLET SR 24 HR, Take 100 mg by mouth 2 times a day., Disp: , Rfl:     amiodarone (CORDARONE) 200 MG Tab, Take 100 mg by mouth every Monday, Wednesday, and Friday. .5 tab = 100 mg  Indications: Atrial Fibrillation, Disp: , Rfl:     History reviewed. No pertinent past medical history.    History reviewed. No pertinent surgical history.    History reviewed. No pertinent family history.  Patient family history was personally reviewed, no pertinent family history to current presentation    Social History     Tobacco Use    Smoking status: Never    Smokeless tobacco: Never   Vaping Use    Vaping status: Never Used   Substance Use Topics    Alcohol use: Yes     Alcohol/week: 8.4 oz     Types: 14 Glasses of wine per week    Drug use: Never       ALLERGIES:  No Known Allergies    Review of systems:  A complete review of symptoms was reviewed with the patient. This is reviewed in H&P and PMH. ALL OTHERS reviewed and negative    Physical exam:  Patient Vitals for the past 24 hrs:   BP Temp Temp src Pulse Resp SpO2 Height Weight   06/25/24 1532 (!) 127/93 -- -- (!) 126 -- 94 % -- --   06/25/24 1502 128/82 -- -- (!) 120 19 93 % -- --   06/25/24 1438 (!) 142/74 -- -- (!) 111 -- 91 % -- --   06/25/24 1423 (!) 144/71 -- -- (!) 134 -- 93 % -- --   06/25/24 1408 (!) 134/91 -- -- (!) 130 -- 93 % -- --   06/25/24 1354 -- -- -- -- -- -- 1.905 m (6' 3\") 120 kg (265 lb)   06/25/24 1339 (!) 126/94 36.3 °C (97.3 °F) Temporal (!) 132 16 94 % -- --     General: Not in acute distress, lying comfortably in bed  EYES: No jaundice  HEENT: OP clear "   Neck:  No carotid bruits, No JVD appreciated  CVS:  Irregularly irregular, Normal S1, S2. No murmurs, rubs or gallops  Resp: Normal respiratory effort, lungs CTA bilaterally. No rales or rhonchi  Abdomen: Soft, non-distended, non-tender to palpation  Skin: No obvious rashes, no cyanosis  Neurological: Alert and oriented x3, moves all extremities, no focal neurologic deficits  Psychiatric: Appropriate affect  Extremities:   Extremities warm, 2+ bilateral radial pulses.  2+ bilateral dp pulses, no lower extremity edema bilaterally      Data:  Laboratory studies personally reviewed by me:  Recent Results (from the past 24 hour(s))   EKG    Collection Time: 24 12:48 PM   Result Value Ref Range    Report       [facility]    Test Date:  2024  Pt Name:    JAMIE GOINS                 Department: [facility]  MRN:        [id number]                      Room:  Gender:     Male                         Technician: EC  :        1960                   Requested By:ARACELIS OLGUIN  Order #:    [id number]                    Reading MD: Aracelis Olguin MD    Measurements  Intervals                                Axis  Rate:       135                          P:          109  MN:         53                           QRS:        84  QRSD:       99                           T:          262  QT:         368  QTc:        552    Interpretive Statements  SUPRAVENTRICULAR TACHYCARDIA  Borderline right axis deviation  Prolonged QT interval  No previous ECG available for comparison  Electronically Signed On 2024 13:18:03 PDT by Aracelis Olguin MD     EKG    Collection Time: 24  1:43 PM   Result Value Ref Range    Report       [facility] Emergency Dept.    Test Date:  2024  Pt Name:    JAMIE RAYMONDCATHY                 Department: ER  MRN:        [id number]                      Room:  Gender:     Male                         Technician: [id number]  :        1960                    Requested By:ER TRIAGE PROTOCOL  Order #:    [id number]                    Reading MD: JANICE PEGUERO MD    Measurements  Intervals                                Axis  Rate:       133                          P:          254  TX:         256                          QRS:        65  QRSD:       103                          T:          244  QT:         391  QTc:        582    Interpretive Statements  Sinus or ectopic atrial tachycardia  Prolonged TX interval  Consider left atrial enlargement  Repol abnrm, prob ischemia, inferolateral lds  Prolonged QT interval  Compared to ECG 06/25/2024 12:48:42  First degree AV block now present  Early repolarization now present  Possible ischemia now present  Supraventricular t achycardia no longer present  Electronically Signed On 06- 13:56:18 PDT by JANICE PEGUERO MD     CBC with Differential    Collection Time: 06/25/24  2:22 PM   Result Value Ref Range    WBC 8.0 4.8 - 10.8 K/uL    RBC 5.06 4.70 - 6.10 M/uL    Hemoglobin 16.6 14.0 - 18.0 g/dL    Hematocrit 46.9 42.0 - 52.0 %    MCV 92.7 81.4 - 97.8 fL    MCH 32.8 27.0 - 33.0 pg    MCHC 35.4 32.3 - 36.5 g/dL    RDW 43.8 35.9 - 50.0 fL    Platelet Count 150 (L) 164 - 446 K/uL    MPV 10.0 9.0 - 12.9 fL    Neutrophils-Polys 55.30 44.00 - 72.00 %    Lymphocytes 31.10 22.00 - 41.00 %    Monocytes 11.20 0.00 - 13.40 %    Eosinophils 1.40 0.00 - 6.90 %    Basophils 0.60 0.00 - 1.80 %    Immature Granulocytes 0.40 0.00 - 0.90 %    Nucleated RBC 0.00 0.00 - 0.20 /100 WBC    Neutrophils (Absolute) 4.44 1.82 - 7.42 K/uL    Lymphs (Absolute) 2.50 1.00 - 4.80 K/uL    Monos (Absolute) 0.90 (H) 0.00 - 0.85 K/uL    Eos (Absolute) 0.11 0.00 - 0.51 K/uL    Baso (Absolute) 0.05 0.00 - 0.12 K/uL    Immature Granulocytes (abs) 0.03 0.00 - 0.11 K/uL    NRBC (Absolute) 0.00 K/uL   Complete Metabolic Panel (CMP)    Collection Time: 06/25/24  2:22 PM   Result Value Ref Range    Sodium 139 135 - 145 mmol/L    Potassium 4.1 3.6 - 5.5 mmol/L     Chloride 106 96 - 112 mmol/L    Co2 20 20 - 33 mmol/L    Anion Gap 13.0 7.0 - 16.0    Glucose 83 65 - 99 mg/dL    Bun 11 8 - 22 mg/dL    Creatinine 0.76 0.50 - 1.40 mg/dL    Calcium 9.5 8.5 - 10.5 mg/dL    Correct Calcium 9.3 8.5 - 10.5 mg/dL    AST(SGOT) 40 12 - 45 U/L    ALT(SGPT) 29 2 - 50 U/L    Alkaline Phosphatase 115 (H) 30 - 99 U/L    Total Bilirubin 1.1 0.1 - 1.5 mg/dL    Albumin 4.2 3.2 - 4.9 g/dL    Total Protein 7.1 6.0 - 8.2 g/dL    Globulin 2.9 1.9 - 3.5 g/dL    A-G Ratio 1.4 g/dL   Troponin STAT    Collection Time: 06/25/24  2:22 PM   Result Value Ref Range    Troponin T 134 (H) 6 - 19 ng/L   Magnesium    Collection Time: 06/25/24  2:22 PM   Result Value Ref Range    Magnesium 2.1 1.5 - 2.5 mg/dL   Phosphorus    Collection Time: 06/25/24  2:22 PM   Result Value Ref Range    Phosphorus 2.8 2.5 - 4.5 mg/dL   TSH (for screening thyroid dysfunction)    Collection Time: 06/25/24  2:22 PM   Result Value Ref Range    TSH 2.540 0.380 - 5.330 uIU/mL   ESTIMATED GFR    Collection Time: 06/25/24  2:22 PM   Result Value Ref Range    GFR (CKD-EPI) 100 >60 mL/min/1.73 m 2   Prothrombin Time    Collection Time: 06/25/24  3:10 PM   Result Value Ref Range    PT 16.6 (H) 12.0 - 14.6 sec    INR 1.33 (H) 0.87 - 1.13   APTT    Collection Time: 06/25/24  3:10 PM   Result Value Ref Range    APTT 33.8 24.7 - 36.0 sec   EC-ECHOCARDIOGRAM COMPLETE W/ CONT    Collection Time: 06/25/24  3:31 PM   Result Value Ref Range    Eject.Frac. MOD BP 51.75     Eject.Frac. MOD 4C 46.7     Eject.Frac. MOD 2C 52.02     Left Ventrical Ejection Fraction 60        Imaging:  EC-ECHOCARDIOGRAM COMPLETE W/ CONT   Final Result      DX-CHEST-PORTABLE (1 VIEW)   Final Result      No acute cardiac or pulmonary abnormalities are identified.          EKG tracings personally reviewed by me shows atrial flutter    Echocardiogram 6/25/24:  No prior study is available for comparison.   Normal left ventricular systolic function.  The left ventricular  ejection fraction is visually estimated to be 60%.  Left ventricular apical wall appears to have increased thickness, consider apical HCM.  Mild tricuspid regurgitation.  Estimated right ventricular systolic pressure is 35 mmHg.    All pertinent features of laboratory and imaging reviewed including primary images where applicable      Principal Problem:    Atrial fibrillation with rapid ventricular response (HCC) (POA: Yes)  Active Problems:    Abnormal EKG (POA: Yes)    Troponin level elevated (POA: Yes)  Resolved Problems:    * No resolved hospital problems. 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 V-rates poorly controlled in the 120-130 bpm  Prior history of AF PVI  Start diltiazem gtt for rate control  Already on metoprolol at home. Takes amiodarone every other day  Given symptomatic atrial arrhythmia, will plan for restoration of sinus rhythm with DCCV. Patient is adamant that he has not missed any doses of Eliquis over the past 2 months--takes faithfully. As such, will plan for cardioversion procedure tomorrow without need for MARGUERITE if schedule allows.  Keep NPO after midnight  Review of echo shows normal LV function. Contrast shows possible spade like ventricular which is commonly seen in ApHCM. No evidence of volume overload of CHF. Would benefit from cardiac MRI as outpatient for evaluation.  Cardiology will continue to follow    The risks, benefits, and alternatives to electrical cardioversion were discussed in great detail. We discussed that conversion of atrial fibrillation to normal rhythm, at least transiently, is successful in 90 to 95% of patients. However, maintaining a normal rhythm depends on a number of factors, including underlying heart disease and antiarrhythmic medications. Atrial fibrillation often recurs with time and other treatments may be  necessary. Risks of cardioversion are low as long as anticoagulation issues are handled appropriately. There is a small (less than 1%) risk of embolic events, including stroke. Risks of electrical shock include mild muscle soreness and mild skin burning at the site of electrode placement. There is also a risk that cardioversion can stimulate more dangerous arrhythmias. The patient verbalized understanding of these potential complications and wishes to proceed with this procedure.      I personally discussed his case with  Dr Vargas Mercado M.D.    Future Appointments   Date Time Provider Department Center   7/1/2024  1:45 PM [facility] ECHO 2 E[facility] STroy Peres   7/10/2024  1:30 PM [facility] [place] FORTE 1 [facility]   9/25/2024  1:00 PM Arnulfo Olguin M.D. [facility] None   12/6/2024 10:20 AM Leoncio Grider P.A.-C. [facility]       It is my pleasure to participate in the care of Mr. Ambriz.  Please do not hesitate to contact me with questions or concerns.    Ajay Sprague MD  Cardiologist, [facility]    6/25/2024    Please note that this dictation was created using voice recognition software. I have made every reasonable attempt to correct obvious errors, but it is possible there are errors of grammar and possibly content that I did not discover before finalizing the note.

## 2024-06-25 NOTE — ED NOTES
Med  rec updated and complete.  Allergies reviewed.      Pt Confirmed name and date of birth.     No antibiotic use in last 30 days at home.    Last dose of Eliquis 06/25/24    Pt only takes his AMIODARONE ( 100 mg)  Monday, Wednesday, Friday.    [phone]

## 2024-06-25 NOTE — H&P
Hospital Medicine History & Physical Note    Date of Service  6/25/2024    Primary Care Physician  Leoncio Grider P.A.-C.    Consultants  Cardiology, Dr Sprague         Code Status  Full Code    Chief Complaint  Chief Complaint   Patient presents with    Sent by MD     Pt was establishing a new cardiologist today when he was noted to be tachycardic and sent here for further evaluation. Pt endorses history of prior ablations. Pt currently on eliquis, amiodarone and metoprolol. Denies chest pain or shortness of breath.  Pt endorses some fatigue.      History of Presenting Illness  Yunior Ambriz is a 64 y.o. male with a past medical history of atrial fibrillation s/p ablation who presented 6/25/2024 with atrial fibrillation and rapid ventricular response.  The patient was sent by cardiology for rapid ventricular rate.  The patient denies noticing any symptoms apart from easy fatigability.  In the emergency room he was found to an elevated ventricular response of 134.  His blood pressure has been well-maintained so far.  He denies having chest pain or shortness of breath.  He reports compliance with his medications.        I discussed the plan of care with emergency physician, the patient and patient family present at bedside in the emergency room.     Review of Systems  Review of Systems   Constitutional:  Positive for malaise/fatigue. Negative for chills and fever.   Eyes:  Negative for discharge and redness.   Respiratory:  Negative for cough, shortness of breath and stridor.    Cardiovascular:  Negative for chest pain and leg swelling.   Gastrointestinal:  Negative for abdominal pain and vomiting.   Genitourinary:  Negative for flank pain.   Musculoskeletal:  Negative for myalgias.   Skin: Negative.    Neurological:  Negative for focal weakness.   Endo/Heme/Allergies:  Does not bruise/bleed easily.   Psychiatric/Behavioral:  The patient is not nervous/anxious.      Past Medical History  Atrial  fibrillation    Surgical History   has no past surgical history on file.     Family History  family history is not on file.      Social History   reports that he has never smoked. He has never used smokeless tobacco. He reports current alcohol use of about 8.4 oz of alcohol per week. He reports that he does not use drugs.    Allergies  No Known Allergies    Medications  Prior to Admission Medications   Prescriptions Last Dose Informant Patient Reported? Taking?   ELIQUIS 5 MG Tab 6/25/2024 at 0900 Patient, Family Member Yes Yes   Sig: Take 5 mg by mouth 2 times a day.   amiodarone (CORDARONE) 200 MG Tab 6/24/2024 at 0900 Patient, Family Member Yes Yes   Sig: Take 100 mg by mouth every Monday, Wednesday, and Friday. .5 tab = 100 mg  Indications: Atrial Fibrillation   azelastine (ASTELIN) 137 MCG/SPRAY nasal spray 6/25/2024 at 0900 Patient, Family Member Yes Yes   Sig: Administer 2 Sprays into affected nostril(S) 2 times a day.   fluticasone (FLONASE) 50 MCG/ACT nasal spray 6/25/2024 at 0900 Patient, Family Member Yes Yes   Sig: Administer 1 Spray into affected nostril(S) 2 times a day.   metoprolol SR (TOPROL XL) 100 MG TABLET SR 24 HR 6/25/2024 at 0900 Patient, Family Member Yes Yes   Sig: Take 100 mg by mouth 2 times a day.      Facility-Administered Medications: None     Physical Exam  Temp:  [36.3 °C (97.3 °F)-36.6 °C (97.9 °F)] 36.6 °C (97.9 °F)  Pulse:  [] 91  Resp:  [14-20] 18  BP: (110-144)/(68-94) 130/85  SpO2:  [91 %-100 %] 94 %  Blood Pressure : (!) 127/93   Temperature: 36.3 °C (97.3 °F)   Pulse: (!) 126   Respiration: 19   Pulse Oximetry: 94 %     Physical Exam  Constitutional:       General: He is not in acute distress.     Appearance: He is not ill-appearing or diaphoretic.   HENT:      Head: Atraumatic.      Right Ear: External ear normal.      Left Ear: External ear normal.      Nose: No congestion or rhinorrhea.      Mouth/Throat:      Mouth: Mucous membranes are moist.   Eyes:      General:  "No scleral icterus.        Right eye: No discharge.         Left eye: No discharge.      Pupils: Pupils are equal, round, and reactive to light.   Cardiovascular:      Rate and Rhythm: Tachycardia present. Rhythm irregular.   Pulmonary:      Effort: Pulmonary effort is normal.   Abdominal:      General: There is no distension.   Musculoskeletal:      Cervical back: Neck supple. No rigidity. No muscular tenderness.      Right lower leg: No edema.      Left lower leg: No edema.   Skin:     Coloration: Skin is not jaundiced or pale.   Neurological:      Mental Status: He is alert and oriented to person, place, and time.      Coordination: Coordination normal.   Psychiatric:         Mood and Affect: Mood normal.         Behavior: Behavior normal.       Laboratory:  Recent Labs     06/24/24  1543 06/25/24  1422   WBC 6.9 8.0   RBC 5.10 5.06   HEMOGLOBIN 16.4 16.6   HEMATOCRIT 48.8 46.9   MCV 95.7 92.7   MCH 32.2 32.8   MCHC 33.6 35.4   RDW 44.4 43.8   PLATELETCT 155* 150*   MPV 10.4 10.0     Recent Labs     06/24/24  1543 06/25/24  1422   SODIUM 140 139   POTASSIUM 4.5 4.1   CHLORIDE 105 106   CO2 24 20   GLUCOSE 101* 83   BUN 11 11   CREATININE 0.79 0.76   CALCIUM 9.1 9.5     Recent Labs     06/24/24  1543 06/25/24  1422   ALTSGPT 32 29   ASTSGOT 42 40   ALKPHOSPHAT 116* 115*   TBILIRUBIN 1.6* 1.1   GLUCOSE 101* 83     Recent Labs     06/25/24  1510   APTT 33.8   INR 1.33*     No results for input(s): \"NTPROBNP\" in the last 72 hours.  Recent Labs     06/24/24  1543   TRIGLYCERIDE 147   HDL 46   *     Recent Labs     06/25/24  1422   TROPONINT 134*     Imaging:  EC-ECHOCARDIOGRAM COMPLETE W/ CONT   Final Result      DX-CHEST-PORTABLE (1 VIEW)   Final Result      No acute cardiac or pulmonary abnormalities are identified.        I patient reviewed patient EKG shows regular wide-complex tachycardia likely atrial tachycardia with left bundle branch block.  QTc is elevated at 582.  There is expected abnormal " repolarization pattern.     Assessment/Plan:  Justification for Admission Status  I anticipate this patient will require at least two midnights for appropriate medical management, necessitating inpatient admission because the patient has difficult to control arrhythmia that will require intravenous antiarrhythmic medication with close monitoring.  In addition the patient will require cardiology consultation and initiation and titration of antiarrhythmic medication.    Patient will need a Telemetry bed on MEDICAL service     * Atrial fibrillation with rapid ventricular response (HCC)- (present on admission)  Assessment & Plan  I will place on continuous cardiac monitoring    Cardiology consulted, appreciate recommendations.  Heart rate did not improve despite multiple pushes of intravenous diltiazem.  Resume home metoprolol and amiodarone  I will start diltiazem drip  I will check thyroid function test  I discussed with cardiology, will likely need cardioversion    Troponin level elevated secondary to Type 2 NSTEMI- (present on admission)  Assessment & Plan  Denies chest pain.  EKG shows regular wide-complex tachycardia likely atrial tachycardia with left bundle branch block.  QTc is elevated at 582.  There is expected abnormal repolarization pattern.    I will place on continuous cardiac monitoring    Will treat the underlying cause, reducing heart rate.  I will trend troponin levels  Stat EKG, troponin for chest pain or worsening shortness of breath   Cardiology consulted, appreciate recommendations.    BMI 33.0-33.9,adult- (present on admission)  Assessment & Plan  At higher risk for atelectasis/hypoxia.  I will order continuous pulse oximetry  Emphasized incentive spirometry          VTE prophylaxis: SCDs/TEDs and therapeutic anticoagulation with apixaban    Patient is critically ill.   The patient continues to have: Atrial fibrillation with rapid ventricular response  The vital organ system that is affected is  the: Cardiovascular  If untreated there is a high chance of deterioration into: Worsening demand ischemia, heart failure, pulmonary edema, possibly shock and eventually death.   The critical care that I am providing today is: Starting diltiazem drip  The critical that has been undertaken is medically complex.   There has been no overlap in critical care time.   Critical Care Time not including procedures: 76 minutes.   Critical Care start Time  4:44 PM    Critical Care end Time   6:00 PM

## 2024-06-25 NOTE — PROGRESS NOTES
Chief Complaint   Patient presents with    New Patient     DX- Chronic atrial fibrillation       Subjective     Yunior Milan Ambriz is a 64 y.o. male who presents today for cardiac care and evaluation of palpitations. Palpitations are sporadic. No specific worsening symptoms or precipitating symptoms. Patient feels like heart is being pulled down. No family history of sudden cardiac death. No presyncope or syncope associated with patient's palpitations.    Patient does have prior history of atrial fibrillation, status post ablation procedure in November 2023.    I have independently interpreted and reviewed blood tests results with patient in clinic which shows LDL level of 120, triglycerides level of 147, GFR of 98, K of 4.5.    I have personally interpreted EKG today with patient, there is no evidence of acute coronary syndrome, no evidence of prior infarct, normal CO and QT interval, no significant conduction disease. +SVT with rate of 135.        History reviewed. No pertinent past medical history.  History reviewed. No pertinent surgical history.  History reviewed. No pertinent family history.  Social History     Socioeconomic History    Marital status:      Spouse name: Not on file    Number of children: Not on file    Years of education: Not on file    Highest education level: Not on file   Occupational History    Not on file   Tobacco Use    Smoking status: Never    Smokeless tobacco: Never   Vaping Use    Vaping status: Never Used   Substance and Sexual Activity    Alcohol use: Yes     Alcohol/week: 8.4 oz     Types: 14 Glasses of wine per week    Drug use: Never    Sexual activity: Not on file   Other Topics Concern    Not on file   Social History Narrative    Not on file     Social Determinants of Health     Financial Resource Strain: Not on file   Food Insecurity: Not on file   Transportation Needs: Not on file   Physical Activity: Not on file   Stress: Not on file   Social Connections: Not on file  "  Intimate Partner Violence: Not on file   Housing Stability: Not on file     Not on File  Outpatient Encounter Medications as of 6/25/2024   Medication Sig Dispense Refill    ELIQUIS 5 MG Tab Take 5 mg by mouth 2 times a day.      azelastine (ASTELIN) 137 MCG/SPRAY nasal spray Administer 2 Sprays into affected nostril(S) 2 times a day.      fluticasone (FLONASE) 50 MCG/ACT nasal spray Administer 1 Spray into affected nostril(S) 2 times a day.      metoprolol SR (TOPROL XL) 100 MG TABLET SR 24 HR Take 100 mg by mouth 2 times a day.      amiodarone (CORDARONE) 200 MG Tab Take 100 mg by mouth every day. TID weekly  Indications: Atrial Fibrillation       No facility-administered encounter medications on file as of 6/25/2024.     Review of Systems   Constitutional:  Negative for chills, fever, malaise/fatigue and weight loss.   HENT:  Negative for ear discharge, ear pain, hearing loss and nosebleeds.    Eyes:  Negative for blurred vision, double vision, pain and discharge.   Respiratory:  Negative for cough and shortness of breath.    Cardiovascular:  Positive for palpitations. Negative for chest pain, orthopnea, claudication, leg swelling and PND.   Gastrointestinal:  Negative for abdominal pain, blood in stool, melena, nausea and vomiting.   Genitourinary:  Negative for dysuria and hematuria.   Musculoskeletal:  Negative for falls, joint pain and myalgias.   Skin:  Negative for itching and rash.   Neurological:  Negative for dizziness, sensory change, speech change, loss of consciousness and headaches.   Endo/Heme/Allergies:  Negative for environmental allergies. Does not bruise/bleed easily.   Psychiatric/Behavioral:  Negative for depression, hallucinations and suicidal ideas.               Objective     /68 (BP Location: Right arm, Patient Position: Sitting, BP Cuff Size: Adult)   Pulse (!) 134   Resp 14   Ht 1.905 m (6' 3\")   Wt 122 kg (270 lb)   SpO2 100%   BMI 33.75 kg/m²     Physical Exam  Vitals and " nursing note reviewed.   Constitutional:       General: He is not in acute distress.     Appearance: He is not diaphoretic.   HENT:      Head: Normocephalic and atraumatic.      Right Ear: External ear normal.      Left Ear: External ear normal.      Nose: No congestion or rhinorrhea.   Eyes:      General:         Right eye: No discharge.         Left eye: No discharge.   Neck:      Thyroid: No thyromegaly.      Vascular: No JVD.   Cardiovascular:      Rate and Rhythm: Normal rate and regular rhythm.      Pulses: Normal pulses.   Pulmonary:      Effort: No respiratory distress.   Abdominal:      General: There is no distension.      Tenderness: There is no abdominal tenderness.   Musculoskeletal:         General: No swelling or tenderness.      Right lower leg: No edema.      Left lower leg: No edema.   Skin:     General: Skin is warm and dry.   Neurological:      Mental Status: He is alert and oriented to person, place, and time.      Cranial Nerves: No cranial nerve deficit.   Psychiatric:         Behavior: Behavior normal.                Assessment & Plan     1. Other chest pain  NM-CARDIAC STRESS TEST      2. SVT (supraventricular tachycardia) (HCC)  EC-ECHOCARDIOGRAM COMPLETE W/O CONT      3. Paroxysmal atrial fibrillation (HCC)  EKG    EC-ECHOCARDIOGRAM COMPLETE W/O CONT      4. Hypercoagulable state due to paroxysmal atrial fibrillation (HCC)        5. Pure hypercholesterolemia        6. HTN (hypertension), malignant            Medical Decision Making: Today's Assessment/Status/Plan:   At this time, patient is symptomatic from his SVT.  Therefore, I do suggest patient to go to the ER for potential IV adenosine therapy to unmask underlying rhythm and further treatment for his symptomatic SVT.    After his current SVT is treated, we will proceed with further restratification with transthoracic echocardiogram and nuclear stress test.    Continue anticoagulation Eliquis 5 mg BID for stroke risk reduction in  setting of [facility].      This visit encounter signifies the visit complexity inherent to evaluation and management associated with medical care services that serve as the continuing focal point for all needed health care services and/or with medical care services that are part of ongoing care related to this patient's single, serious condition, complex cardiac condition.    Arnulfo Olguin M.D.

## 2024-06-26 ENCOUNTER — ANESTHESIA EVENT (OUTPATIENT)
Dept: CARDIOLOGY | Facility: MEDICAL CENTER | Age: 64
End: 2024-06-26
Payer: MEDICARE

## 2024-06-26 ENCOUNTER — ANESTHESIA (OUTPATIENT)
Dept: CARDIOLOGY | Facility: MEDICAL CENTER | Age: 64
End: 2024-06-26
Payer: MEDICARE

## 2024-06-26 ENCOUNTER — APPOINTMENT (OUTPATIENT)
Dept: CARDIOLOGY | Facility: MEDICAL CENTER | Age: 64
DRG: 281 | End: 2024-06-26
Attending: INTERNAL MEDICINE
Payer: MEDICARE

## 2024-06-26 VITALS
OXYGEN SATURATION: 95 % | HEART RATE: 65 BPM | WEIGHT: 261.47 LBS | HEIGHT: 75 IN | RESPIRATION RATE: 18 BRPM | SYSTOLIC BLOOD PRESSURE: 133 MMHG | DIASTOLIC BLOOD PRESSURE: 81 MMHG | TEMPERATURE: 97.8 F | BODY MASS INDEX: 32.51 KG/M2

## 2024-06-26 LAB
ALBUMIN SERPL BCP-MCNC: 4.1 G/DL (ref 3.2–4.9)
ALBUMIN/GLOB SERPL: 1.5 G/DL
ALP SERPL-CCNC: 113 U/L (ref 30–99)
ALT SERPL-CCNC: 29 U/L (ref 2–50)
ANION GAP SERPL CALC-SCNC: 13 MMOL/L (ref 7–16)
AST SERPL-CCNC: 39 U/L (ref 12–45)
BILIRUB SERPL-MCNC: 1.6 MG/DL (ref 0.1–1.5)
BUN SERPL-MCNC: 10 MG/DL (ref 8–22)
CALCIUM ALBUM COR SERPL-MCNC: 9.1 MG/DL (ref 8.5–10.5)
CALCIUM SERPL-MCNC: 9.2 MG/DL (ref 8.5–10.5)
CHLORIDE SERPL-SCNC: 105 MMOL/L (ref 96–112)
CO2 SERPL-SCNC: 21 MMOL/L (ref 20–33)
CREAT SERPL-MCNC: 0.69 MG/DL (ref 0.5–1.4)
EKG IMPRESSION: NORMAL
ERYTHROCYTE [DISTWIDTH] IN BLOOD BY AUTOMATED COUNT: 44.2 FL (ref 35.9–50)
GFR SERPLBLD CREATININE-BSD FMLA CKD-EPI: 103 ML/MIN/1.73 M 2
GLOBULIN SER CALC-MCNC: 2.8 G/DL (ref 1.9–3.5)
GLUCOSE SERPL-MCNC: 93 MG/DL (ref 65–99)
HCT VFR BLD AUTO: 48.1 % (ref 42–52)
HGB BLD-MCNC: 17 G/DL (ref 14–18)
MAGNESIUM SERPL-MCNC: 2 MG/DL (ref 1.5–2.5)
MCH RBC QN AUTO: 33.3 PG (ref 27–33)
MCHC RBC AUTO-ENTMCNC: 35.3 G/DL (ref 32.3–36.5)
MCV RBC AUTO: 94.1 FL (ref 81.4–97.8)
PLATELET # BLD AUTO: 147 K/UL (ref 164–446)
PMV BLD AUTO: 10.2 FL (ref 9–12.9)
POTASSIUM SERPL-SCNC: 3.9 MMOL/L (ref 3.6–5.5)
PROT SERPL-MCNC: 6.9 G/DL (ref 6–8.2)
RBC # BLD AUTO: 5.11 M/UL (ref 4.7–6.1)
SODIUM SERPL-SCNC: 139 MMOL/L (ref 135–145)
WBC # BLD AUTO: 8.1 K/UL (ref 4.8–10.8)

## 2024-06-26 PROCEDURE — 700111 HCHG RX REV CODE 636 W/ 250 OVERRIDE (IP): Performed by: ANESTHESIOLOGY

## 2024-06-26 PROCEDURE — 92960 CARDIOVERSION ELECTRIC EXT: CPT | Performed by: INTERNAL MEDICINE

## 2024-06-26 PROCEDURE — 5A2204Z RESTORATION OF CARDIAC RHYTHM, SINGLE: ICD-10-PCS | Performed by: INTERNAL MEDICINE

## 2024-06-26 PROCEDURE — 700102 HCHG RX REV CODE 250 W/ 637 OVERRIDE(OP): Performed by: HOSPITALIST

## 2024-06-26 PROCEDURE — A9270 NON-COVERED ITEM OR SERVICE: HCPCS | Performed by: HOSPITALIST

## 2024-06-26 PROCEDURE — 160002 HCHG RECOVERY MINUTES (STAT)

## 2024-06-26 PROCEDURE — 80053 COMPREHEN METABOLIC PANEL: CPT

## 2024-06-26 PROCEDURE — 85027 COMPLETE CBC AUTOMATED: CPT

## 2024-06-26 PROCEDURE — 83735 ASSAY OF MAGNESIUM: CPT

## 2024-06-26 PROCEDURE — 4410588 CL-CARDIOVERSION

## 2024-06-26 PROCEDURE — 93010 ELECTROCARDIOGRAM REPORT: CPT | Performed by: INTERNAL MEDICINE

## 2024-06-26 PROCEDURE — 99232 SBSQ HOSP IP/OBS MODERATE 35: CPT | Mod: 25,FS | Performed by: INTERNAL MEDICINE

## 2024-06-26 PROCEDURE — 99239 HOSP IP/OBS DSCHRG MGMT >30: CPT | Performed by: INTERNAL MEDICINE

## 2024-06-26 PROCEDURE — 160035 HCHG PACU - 1ST 60 MINS PHASE I

## 2024-06-26 PROCEDURE — 700105 HCHG RX REV CODE 258: Performed by: ANESTHESIOLOGY

## 2024-06-26 PROCEDURE — 93005 ELECTROCARDIOGRAM TRACING: CPT | Performed by: INTERNAL MEDICINE

## 2024-06-26 PROCEDURE — 700101 HCHG RX REV CODE 250: Performed by: ANESTHESIOLOGY

## 2024-06-26 RX ORDER — ONDANSETRON 2 MG/ML
4 INJECTION INTRAMUSCULAR; INTRAVENOUS
Status: DISCONTINUED | OUTPATIENT
Start: 2024-06-26 | End: 2024-06-26 | Stop reason: HOSPADM

## 2024-06-26 RX ORDER — EPHEDRINE SULFATE 50 MG/ML
5 INJECTION, SOLUTION INTRAVENOUS
Status: DISCONTINUED | OUTPATIENT
Start: 2024-06-26 | End: 2024-06-26 | Stop reason: HOSPADM

## 2024-06-26 RX ORDER — DIPHENHYDRAMINE HYDROCHLORIDE 50 MG/ML
12.5 INJECTION INTRAMUSCULAR; INTRAVENOUS
Status: DISCONTINUED | OUTPATIENT
Start: 2024-06-26 | End: 2024-06-26 | Stop reason: HOSPADM

## 2024-06-26 RX ORDER — SODIUM CHLORIDE, SODIUM LACTATE, POTASSIUM CHLORIDE, CALCIUM CHLORIDE 600; 310; 30; 20 MG/100ML; MG/100ML; MG/100ML; MG/100ML
INJECTION, SOLUTION INTRAVENOUS
Status: DISCONTINUED | OUTPATIENT
Start: 2024-06-26 | End: 2024-06-26 | Stop reason: SURG

## 2024-06-26 RX ORDER — LIDOCAINE HYDROCHLORIDE 20 MG/ML
INJECTION, SOLUTION EPIDURAL; INFILTRATION; INTRACAUDAL; PERINEURAL PRN
Status: DISCONTINUED | OUTPATIENT
Start: 2024-06-26 | End: 2024-06-26 | Stop reason: SURG

## 2024-06-26 RX ORDER — IPRATROPIUM BROMIDE AND ALBUTEROL SULFATE 2.5; .5 MG/3ML; MG/3ML
3 SOLUTION RESPIRATORY (INHALATION)
Status: DISCONTINUED | OUTPATIENT
Start: 2024-06-26 | End: 2024-06-26 | Stop reason: HOSPADM

## 2024-06-26 RX ORDER — HYDRALAZINE HYDROCHLORIDE 20 MG/ML
5 INJECTION INTRAMUSCULAR; INTRAVENOUS
Status: DISCONTINUED | OUTPATIENT
Start: 2024-06-26 | End: 2024-06-26 | Stop reason: HOSPADM

## 2024-06-26 RX ORDER — OXYCODONE HCL 5 MG/5 ML
5 SOLUTION, ORAL ORAL
Status: DISCONTINUED | OUTPATIENT
Start: 2024-06-26 | End: 2024-06-26 | Stop reason: HOSPADM

## 2024-06-26 RX ORDER — HALOPERIDOL 5 MG/ML
1 INJECTION INTRAMUSCULAR
Status: DISCONTINUED | OUTPATIENT
Start: 2024-06-26 | End: 2024-06-26 | Stop reason: HOSPADM

## 2024-06-26 RX ORDER — LABETALOL HYDROCHLORIDE 5 MG/ML
5 INJECTION, SOLUTION INTRAVENOUS
Status: DISCONTINUED | OUTPATIENT
Start: 2024-06-26 | End: 2024-06-26 | Stop reason: HOSPADM

## 2024-06-26 RX ORDER — SODIUM CHLORIDE, SODIUM LACTATE, POTASSIUM CHLORIDE, CALCIUM CHLORIDE 600; 310; 30; 20 MG/100ML; MG/100ML; MG/100ML; MG/100ML
INJECTION, SOLUTION INTRAVENOUS CONTINUOUS
OUTPATIENT
Start: 2024-06-26

## 2024-06-26 RX ORDER — METOPROLOL TARTRATE 1 MG/ML
1 INJECTION, SOLUTION INTRAVENOUS
Status: DISCONTINUED | OUTPATIENT
Start: 2024-06-26 | End: 2024-06-26 | Stop reason: HOSPADM

## 2024-06-26 RX ORDER — OXYCODONE HCL 5 MG/5 ML
10 SOLUTION, ORAL ORAL
Status: DISCONTINUED | OUTPATIENT
Start: 2024-06-26 | End: 2024-06-26 | Stop reason: HOSPADM

## 2024-06-26 RX ADMIN — LIDOCAINE HYDROCHLORIDE 50 MG: 20 INJECTION, SOLUTION EPIDURAL; INFILTRATION; INTRACAUDAL at 13:36

## 2024-06-26 RX ADMIN — PROPOFOL 30 MG: 10 INJECTION, EMULSION INTRAVENOUS at 13:38

## 2024-06-26 RX ADMIN — SODIUM CHLORIDE, POTASSIUM CHLORIDE, SODIUM LACTATE AND CALCIUM CHLORIDE: 600; 310; 30; 20 INJECTION, SOLUTION INTRAVENOUS at 13:35

## 2024-06-26 RX ADMIN — PROPOFOL 100 MG: 10 INJECTION, EMULSION INTRAVENOUS at 13:36

## 2024-06-26 RX ADMIN — METOPROLOL SUCCINATE 100 MG: 50 TABLET, EXTENDED RELEASE ORAL at 04:55

## 2024-06-26 RX ADMIN — APIXABAN 5 MG: 5 TABLET, FILM COATED ORAL at 04:55

## 2024-06-26 RX ADMIN — AMIODARONE HYDROCHLORIDE 100 MG: 200 TABLET ORAL at 09:13

## 2024-06-26 ASSESSMENT — ENCOUNTER SYMPTOMS
SHORTNESS OF BREATH: 0
COUGH: 0
ABDOMINAL PAIN: 0
FATIGUE: 1
MYALGIAS: 0
FEVER: 0
ARTHRALGIAS: 0
ABDOMINAL DISTENTION: 0
DIZZINESS: 0
VOMITING: 0
NAUSEA: 0
CHILLS: 0
PSYCHIATRIC NEGATIVE: 1
LIGHT-HEADEDNESS: 0
DIARRHEA: 0
ENDOCRINE NEGATIVE: 1
DIAPHORESIS: 0
PALPITATIONS: 0
COLOR CHANGE: 0
CHEST TIGHTNESS: 0
EYES NEGATIVE: 1
BRUISES/BLEEDS EASILY: 0
CONSTIPATION: 0

## 2024-06-26 ASSESSMENT — PAIN DESCRIPTION - PAIN TYPE
TYPE: ACUTE PAIN

## 2024-06-26 ASSESSMENT — PAIN SCALES - GENERAL: PAIN_LEVEL: 0

## 2024-06-26 ASSESSMENT — FIBROSIS 4 INDEX: FIB4 SCORE: 3.2

## 2024-06-26 NOTE — ASSESSMENT & PLAN NOTE
Denies chest pain.  EKG shows regular wide-complex tachycardia likely atrial tachycardia with left bundle branch block.  QTc is elevated at 582.  There is expected abnormal repolarization pattern.    I will place on continuous cardiac monitoring    Will treat the underlying cause, reducing heart rate.  I will trend troponin levels  Stat EKG, troponin for chest pain or worsening shortness of breath   Cardiology consulted, appreciate recommendations.

## 2024-06-26 NOTE — ANESTHESIA POSTPROCEDURE EVALUATION
Patient: Yunior Ambriz    Procedure Summary       Date: 06/26/24 Room / Location: [facility] - Echocardiology [facility]    Anesthesia Start: 1330 Anesthesia Stop: 1359    Procedure: CL-CARDIOVERSION Diagnosis:       Atrial fibrillation with rapid ventricular response (HCC)      Atrial fibrillation with rapid ventricular response (HCC)      (See Assoicated Dx)    Scheduled Providers: Dany Nathan M.D.; Ajay ZURITA M.D. Responsible Provider: Dany Nathan M.D.    Anesthesia Type: general ASA Status: 2            Final Anesthesia Type: general  Last vitals  BP   Blood Pressure : (!) 122/95    Temp   36.5 °C (97.7 °F)    Pulse   77   Resp   18    SpO2   92 %      Anesthesia Post Evaluation    Patient location during evaluation: PACU  Patient participation: complete - patient participated  Level of consciousness: awake and alert  Pain score: 0    Airway patency: patent  Anesthetic complications: no  Cardiovascular status: hemodynamically stable  Respiratory status: acceptable  Hydration status: euvolemic    PONV: none          There were no known notable events for this encounter.     Nurse Pain Score: 0 (NPRS)

## 2024-06-26 NOTE — PROCEDURES
Procedure performed: External Direct Current Cardioversion    : Ajay Sprague MD    Assistant: None    Anesthesia: per Anesthesia services    Indication: Atrial Fibrillation    Preprocedural Diagnosis:   Atrial Fibrillation  Postprocedural Diagnosis: Sinus Rhythm      Description of procedure:  Mr. Ambriz was brought to the pre/post procedure area of the cath lab. Informed consent was obtained. Defibrillator pads were placed in the anterior and posterior position.  A TIME-OUT was performed. Adequate sedation was obtained with assistance of anesthesia. The patient was successfully cardioverted with  200 J synchronized biphasic energy into sinus rhythm. He was monitored in the recovery area until criteria met.    Conclusion: Successful DC cardioversion    Complications: None apparent      Electronically signed: Ajay Sprague MD  Cardiologist, [facility]

## 2024-06-26 NOTE — PROGRESS NOTES
Cardiology Follow Up Progress Note    Date of Service  6/26/2024    Attending Physician  Jina Ortega M.D.    Chief Complaint   Symptomatic atrial fibrillation    HPI  Yunior Ambriz is a 64 y.o. male with a history of paroxysmal atrial fibrillation s/p ablation in 2023 and prior cardioversion admitted 6/25/2024 with atrial fibrillation with RVR.    Interim Events  6/26/2024: No cardiac events overnight.  Atrial fibrillation telemetry.  Vital signs stable with intermittent hypertension.  SpO2 92 to 95% on room air.  He reports he is feeling well today and that his symptoms have improved. No chest pain or palpitations. No shortness of breath, dyspnea on exertion, orthopnea or PND. No lower extremity edema. No dizziness or lightheadedness. No syncope or presyncope.      Review of Systems  Review of Systems   Constitutional:  Positive for fatigue. Negative for chills, diaphoresis and fever.   HENT: Negative.     Eyes: Negative.    Respiratory:  Negative for cough, chest tightness and shortness of breath.    Cardiovascular:  Negative for chest pain, palpitations and leg swelling.   Gastrointestinal:  Negative for abdominal distention, abdominal pain, constipation, diarrhea, nausea and vomiting.   Endocrine: Negative.    Genitourinary:  Negative for decreased urine volume, difficulty urinating, dysuria, frequency and urgency.   Musculoskeletal:  Negative for arthralgias and myalgias.   Skin:  Negative for color change.   Neurological:  Negative for dizziness, syncope and light-headedness.   Hematological:  Does not bruise/bleed easily.   Psychiatric/Behavioral: Negative.         Vital signs in last 24 hours  Temp:  [36.3 °C (97.3 °F)-36.6 °C (97.9 °F)] 36.5 °C (97.7 °F)  Pulse:  [] 77  Resp:  [16-20] 18  BP: (110-149)/() 122/95  SpO2:  [91 %-95 %] 92 %    Physical Exam  Physical Exam  Vitals and nursing note reviewed.   Constitutional:       General: He is not in acute distress.     Appearance: Normal  appearance. He is not toxic-appearing.   HENT:      Head: Normocephalic and atraumatic.      Right Ear: External ear normal.      Left Ear: External ear normal.      Nose: Nose normal.      Mouth/Throat:      Mouth: Mucous membranes are moist.      Pharynx: Oropharynx is clear.   Eyes:      General: No scleral icterus.     Extraocular Movements: Extraocular movements intact.      Conjunctiva/sclera: Conjunctivae normal.      Pupils: Pupils are equal, round, and reactive to light.   Neck:      Vascular: No JVD.   Cardiovascular:      Rate and Rhythm: Tachycardia present. Rhythm irregular.      Pulses: Normal pulses.      Heart sounds: Normal heart sounds. No murmur heard.     No friction rub. No gallop.   Pulmonary:      Effort: Pulmonary effort is normal.      Breath sounds: Normal breath sounds.   Abdominal:      General: Abdomen is flat. Bowel sounds are normal. There is no distension.      Palpations: Abdomen is soft.      Tenderness: There is no abdominal tenderness.   Musculoskeletal:         General: Normal range of motion.      Cervical back: Normal range of motion and neck supple.      Right lower leg: No edema.      Left lower leg: No edema.   Skin:     General: Skin is warm and dry.      Capillary Refill: Capillary refill takes less than 2 seconds.      Coloration: Skin is not jaundiced.   Neurological:      General: No focal deficit present.      Mental Status: He is alert and oriented to person, place, and time. Mental status is at baseline.   Psychiatric:         Mood and Affect: Mood normal.         Behavior: Behavior normal.         Judgment: Judgment normal.         Lab Review  Lab Results   Component Value Date/Time    WBC 8.1 06/26/2024 12:43 AM    RBC 5.11 06/26/2024 12:43 AM    HEMOGLOBIN 17.0 06/26/2024 12:43 AM    HEMATOCRIT 48.1 06/26/2024 12:43 AM    MCV 94.1 06/26/2024 12:43 AM    MCH 33.3 (H) 06/26/2024 12:43 AM    MCHC 35.3 06/26/2024 12:43 AM    MPV 10.2 06/26/2024 12:43 AM      Lab  "Results   Component Value Date/Time    SODIUM 139 06/26/2024 12:43 AM    POTASSIUM 3.9 06/26/2024 12:43 AM    CHLORIDE 105 06/26/2024 12:43 AM    CO2 21 06/26/2024 12:43 AM    GLUCOSE 93 06/26/2024 12:43 AM    BUN 10 06/26/2024 12:43 AM    CREATININE 0.69 06/26/2024 12:43 AM      Lab Results   Component Value Date/Time    ASTSGOT 39 06/26/2024 12:43 AM    ALTSGPT 29 06/26/2024 12:43 AM     Lab Results   Component Value Date/Time    CHOLSTRLTOT 195 06/24/2024 03:43 PM     (H) 06/24/2024 03:43 PM    HDL 46 06/24/2024 03:43 PM    TRIGLYCERIDE 147 06/24/2024 03:43 PM    TROPONINT 146 (H) 06/25/2024 08:18 PM       No results for input(s): \"NTPROBNP\" in the last 72 hours.    Cardiac Imaging and Procedures Review    Echocardiogram:  6/25/2024  CONCLUSIONS  No prior study is available for comparison.   Normal left ventricular systolic function.  The left ventricular ejection fraction is visually estimated to be 60%.  Left ventricular apical wall appears to have increased thickness,   consider apical HCM.  Mild tricuspid regurgitation.  Estimated right ventricular systolic pressure is 35 mmHg.    Imaging  Chest X-Ray:  6/25/2024   FINDINGS:  Heart size is mildly enlarged.  No pulmonary infiltrates or consolidations are noted.  No pleural abnormalities are noted.  IMPRESSION:  No acute cardiac or pulmonary abnormalities are identified.      Assessment/Plan  #Atrial fibrillation/flutter with RVR  #History of paroxysmal atrial fibrillation s/p prior ablation in 2003  #Elevated troponin due to demand ischemia.  #Concern for Nahomy syndrome    Recommendations:  -His symptoms have improved with rate control.  -LVEF preserved on echocardiogram.  -Continue amiodarone 100 mg Monday, Wednesday, Friday.  -Continue Eliquis 5 mg twice daily for CVA prevention.  He indicates good compliance and that he has not missed any doses over the past 30 days.  -Continue metoprolol 100 mg twice daily.  -Continue diltiazem gtt.  -Keep NPO.  " Plan for cardioversion at 1 PM today.  -Consider outpatient cardiac MRI to further evaluate apical HCM.  -Outpatient follow-up with the EP for consideration of a flutter ablation and alternative antiarrhythmics.    Cardiology will continue to follow.    Please contact me with any questions.    Thank you for allowing me to participate in the care of Yunior Ambriz .    Ruth Blake PA-C, Cardiology  [facility] [address]  [address] [address]  Phone: [phone]  Fax: [phone]    PLEASE NOTE: This note was created using voice recognition software. I have made every reasonable attempt to correct obvious errors, but I expect that there are errors of grammar and possibly content that I did not discover before finalizing the note.     I personally spent a total of 15 minutes which includes face-to-face time and non-face-to-face time spent on preparing to see the patient, reviewing hospital notes and tests, obtaining history from the patient, performing a medically appropriate exam, counseling and educating the patient, ordering medications/tests/procedures/referrals as clinically indicated, and documenting information in the electronic medical record.

## 2024-06-26 NOTE — PROGRESS NOTES
Received bedside report from JACQUES Shea, pt care assumed. VS stable, Pt A&Ox4, c/o 0/10 pain at this time. Pt denies any additional needs at this time. Bed locked and in lowest position, frame alarm on, pt calls appropriately. Pt educated on fall risk and verbalized understanding, call light within reach, hourly rounding initiated.

## 2024-06-26 NOTE — CARE PLAN
The patient is Stable - Low risk of patient condition declining or worsening    Shift Goals  Clinical Goals: Monitor HR/Rhythm, hemodynamic stablity  Patient Goals: cardioversion, discharge, comfort  Family Goals: not present    Progress made toward(s) clinical / shift goals:    Problem: Knowledge Deficit - Standard  Goal: Patient and family/care givers will demonstrate understanding of plan of care, disease process/condition, diagnostic tests and medications  Outcome: Progressing     Problem: Psychosocial  Goal: Patient's level of anxiety will decrease  Outcome: Progressing     Problem: Hemodynamics  Goal: Patient's hemodynamics, fluid balance and neurologic status will be stable or improve  Outcome: Progressing     Problem: Respiratory  Goal: Patient will achieve/maintain optimum respiratory ventilation and gas exchange  Outcome: Progressing     Problem: Urinary Elimination  Goal: Establish and maintain regular urinary output  Outcome: Progressing     Problem: Mobility  Goal: Patient's capacity to carry out activities will improve  Outcome: Progressing

## 2024-06-26 NOTE — ASSESSMENT & PLAN NOTE
I will place on continuous cardiac monitoring    Cardiology consulted, appreciate recommendations.  Heart rate did not improve despite multiple pushes of intravenous diltiazem.  Resume home metoprolol and amiodarone  I will start diltiazem drip  I will check thyroid function test  I discussed with cardiology, will likely need cardioversion

## 2024-06-26 NOTE — ASSESSMENT & PLAN NOTE
At higher risk for atelectasis/hypoxia.  I will order continuous pulse oximetry  Emphasized incentive spirometry

## 2024-06-26 NOTE — PROGRESS NOTES
Patient recovered well post cardioversion. A&Ox4. VSS, room air. Surgical sites CDI.. Patient able to drink fluids without Nausea and vomiting. PT belongings in his room. Spouse updated and discussed plan of care. Report called to Monse. Patient taken back to his room by this writer on a monitor.

## 2024-06-26 NOTE — PROGRESS NOTES
4 Eyes Skin Assessment Completed by JACQUES Shea and JACQUES Craft.    Head WDL  Ears WDL  Nose WDL  Mouth WDL  Neck WDL  Breast/Chest WDL  Shoulder Blades WDL  Spine WDL  (R) Arm/Elbow/Hand WDL  (L) Arm/Elbow/Hand WDL  Abdomen WDL  Groin WDL  Scrotum/Coccyx/Buttocks WDL  (R) Leg WDL  (L) Leg WDL  (R) Heel/Foot/Toe WDL  (L) Heel/Foot/Toe WDL          Devices In Places Tele Box, Blood Pressure Cuff, and Pulse Ox      Interventions In Place Pillows    Possible Skin Injury No    Pictures Uploaded Into Epic N/A  Wound Consult Placed N/A  RN Wound Prevention Protocol Ordered No

## 2024-06-26 NOTE — PROGRESS NOTES
Pt returned from cardioversion in stable condition. AO x4, on room air, post op vitals initiated and VSS. Tolerating oral intake, denies pain. Per cardiology note if pt remains stable will d/c later today. Bed locked in lowest position and call light within reach.

## 2024-06-26 NOTE — ANESTHESIA PREPROCEDURE EVALUATION
Date/Time: 06/26/24 1300    Scheduled providers: Dany Nathan M.D.; Ajay ZURITA M.D.    Procedure: CL-CARDIOVERSION    Diagnosis:       Atrial fibrillation with rapid ventricular response (HCC) [I48.91]      Atrial fibrillation with rapid ventricular response (HCC) [I48.91]    Indications: See Assoicated Dx    Location: [facility] - Echocardiology [facility]            Relevant Problems   CARDIAC   (positive) Atrial fibrillation with rapid ventricular response (HCC)   (positive) Chronic atrial fibrillation (HCC)       Physical Exam    Airway   Mallampati: III  TM distance: >3 FB  Neck ROM: full       Cardiovascular - normal exam  Rhythm: regular  Rate: normal  (-) murmur     Dental - normal exam           Pulmonary - normal exam  Breath sounds clear to auscultation     Abdominal    Neurological - normal exam                   Anesthesia Plan    ASA 2       Plan - general       Airway plan will be mask          Induction: intravenous      Pertinent diagnostic labs and testing reviewed    Informed Consent:    Anesthetic plan and risks discussed with patient.    Use of blood products discussed with: patient whom consented to blood products.

## 2024-06-26 NOTE — PROGRESS NOTES
Dr Mercado notified of HR 70s-80s. RN instructed to hold Diltiazem drip. EKG completed showing aflutter rate 65. Hospitalist notified. No new orders at this time.

## 2024-06-26 NOTE — ANESTHESIA TIME REPORT
Anesthesia Start and Stop Event Times       Date Time Event    6/26/2024 1330 Ready for Procedure     1330 Anesthesia Start     1359 Anesthesia Stop          Responsible Staff  06/26/24      Name Role Begin End    Dany Nathan M.D. Anesth 1330 1359          Overtime Reason:  no overtime (within assigned shift)    Comments:

## 2024-06-27 ENCOUNTER — PATIENT OUTREACH (OUTPATIENT)
Dept: MEDICAL GROUP | Facility: MEDICAL CENTER | Age: 64
End: 2024-06-27
Payer: MEDICARE

## 2024-06-27 NOTE — PROGRESS NOTES
Transitional Care Management  TCM Outreach Date and Time: Filed (6/27/2024  9:21 AM)    Discharge Questions  Actual Discharge Date: 06/26/24  Now that you are home, how are you feeling?: Very Good  Did you receive any new prescriptions?: No  Do you have any questions about your current medications or new medications (Review Med Rec)?: No  Did you have any durable medical equipment ordered?: No  Do you have a follow up appointment scheduled with your PCP?: Yes  Appointment Date: 07/02/24  Appointment Time: 1120  Any issues or paperwork you wish to discuss with your PCP?: No  Are you (patient) able to get to the appointment?: Yes  If Home Health was ordered, have they contacted you (Patient): Not Applicable  Did you have enough support after your last discharge?: Yes (spouse)  Does this patient qualify for the CCM program?: No    Transitional Care  Number of attempts made to contact patient: 1  Current or previous attempts completed within two business days of discharge? : Yes  Provided education regarding treatment plan, medications, self-management, ADLs?: No  Has patient completed an Advanced Directive?: No  Has the Care Manager's phone number provided?: No  Is there anything else I can help you with?: No    Discharge Summary  Chief Complaint: Chest Pain  Admitting Diagnosis: Atrial fibrillation with rapid ventricular response  Discharge Diagnosis: Atrial fibrillation with rapid ventricular response

## 2024-06-27 NOTE — DISCHARGE SUMMARY
Discharge Summary    CHIEF COMPLAINT ON ADMISSION  Chief Complaint   Patient presents with    Sent by MD     Pt was establishing a new cardiologist today when he was noted to be tachycardic and sent here for further evaluation. Pt endorses history of prior ablations. Pt currently on eliquis, amiodarone and metoprolol. Denies chest pain or shortness of breath.  Pt endorses some fatigue.        Reason for Admission  High Heart Rate     Admission Date  6/25/2024    CODE STATUS  Prior    HPI & HOSPITAL COURSE  This is a 64 y.o. male  with a past medical history of atrial fibrillation s/p ablation who presented 6/25/2024 with atrial fibrillation and rapid ventricular response.  The patient was sent by cardiology for rapid ventricular rate.  The patient denies noticing any symptoms apart from easy fatigability.  In the emergency room he was found to an elevated ventricular response of 134.   Patient was admitted for further treatment and cardiology was consulted. Patient then had a cardioversion on 6/26/24 and converted to sinus rhythm. Patient now doing better and per cardiology can be discharged and will need to follow up as outpatient   Will discharge patient home today     The patient recovered much more quickly than anticipated on admission.    Discharge Date  6/26/2024    FOLLOW UP ITEMS POST DISCHARGE  Cardiology     DISCHARGE DIAGNOSES  Principal Problem:    Atrial fibrillation with rapid ventricular response (HCC) (POA: Yes)  Active Problems:    Troponin level elevated secondary to Type 2 NSTEMI (POA: Yes)    BMI 33.0-33.9,adult (POA: Yes)  Resolved Problems:    * No resolved hospital problems. *      FOLLOW UP  Future Appointments   Date Time Provider Department Center   7/1/2024  1:45 PM [facility] ECHO 2 [facility]   7/2/2024 11:20 AM Leoncio Grider P.A.-C. [facility]   7/10/2024  1:30 PM [facility] NM FORTE 1 [facility]   9/25/2024  1:00 PM JACKELIN Goff None   12/6/2024 10:20 AM  Leoncio Grider P.A.-C. [facility]     Leoncio Grider P.A.-C.  [address]  [address]  [place] [id number] [phone]            MEDICATIONS ON DISCHARGE     Medication List        CONTINUE taking these medications        Instructions   amiodarone 200 MG Tabs  Commonly known as: Cordarone   Take 100 mg by mouth every Monday, Wednesday, and Friday. .5 tab = 100 mg  Indications: Atrial Fibrillation  Dose: 100 mg     azelastine 137 MCG/SPRAY nasal spray  Commonly known as: Astelin   Administer 2 Sprays into affected nostril(S) 2 times a day.  Dose: 2 Spray     Eliquis 5 MG Tabs  Generic drug: apixaban   Take 5 mg by mouth 2 times a day.  Dose: 5 mg     fluticasone 50 MCG/ACT nasal spray  Commonly known as: Flonase   Administer 1 Spray into affected nostril(S) 2 times a day.  Dose: 1 Spray     metoprolol  MG Tb24  Commonly known as: Toprol XL   Take 100 mg by mouth 2 times a day.  Dose: 100 mg              Allergies  No Known Allergies    DIET  No orders of the defined types were placed in this encounter.      ACTIVITY  As tolerated.  Weight bearing as tolerated    CONSULTATIONS  Cardiology     PROCEDURES  Cardioversion     LABORATORY  Lab Results   Component Value Date    SODIUM 139 06/26/2024    POTASSIUM 3.9 06/26/2024    CHLORIDE 105 06/26/2024    CO2 21 06/26/2024    GLUCOSE 93 06/26/2024    BUN 10 06/26/2024    CREATININE 0.69 06/26/2024        Lab Results   Component Value Date    WBC 8.1 06/26/2024    HEMOGLOBIN 17.0 06/26/2024    HEMATOCRIT 48.1 06/26/2024    PLATELETCT 147 (L) 06/26/2024        Total time of the discharge process exceeds 35 minutes.

## 2024-06-27 NOTE — PROGRESS NOTES
Patient to be discharged today - patient aware and agreeable to plan. D/c instructions reviewed with patient - ?'s/concerns answered. No meds to beds, no piv present.

## 2024-07-01 ENCOUNTER — APPOINTMENT (OUTPATIENT)
Dept: CARDIOLOGY | Facility: MEDICAL CENTER | Age: 64
End: 2024-07-01
Attending: INTERNAL MEDICINE
Payer: MEDICARE

## 2024-07-01 DIAGNOSIS — I47.10 SVT (SUPRAVENTRICULAR TACHYCARDIA) (HCC): ICD-10-CM

## 2024-07-01 DIAGNOSIS — I48.0 PAROXYSMAL ATRIAL FIBRILLATION (HCC): ICD-10-CM

## 2024-07-01 PROCEDURE — 700117 HCHG RX CONTRAST REV CODE 255: Performed by: INTERNAL MEDICINE

## 2024-07-01 PROCEDURE — 93306 TTE W/DOPPLER COMPLETE: CPT

## 2024-07-01 RX ADMIN — HUMAN ALBUMIN MICROSPHERES AND PERFLUTREN 3 ML: 10; .22 INJECTION, SOLUTION INTRAVENOUS at 14:47

## 2024-07-02 ENCOUNTER — APPOINTMENT (OUTPATIENT)
Dept: MEDICAL GROUP | Facility: MEDICAL CENTER | Age: 64
End: 2024-07-02
Payer: MEDICARE

## 2024-07-02 VITALS
HEART RATE: 60 BPM | BODY MASS INDEX: 32.83 KG/M2 | DIASTOLIC BLOOD PRESSURE: 74 MMHG | TEMPERATURE: 97 F | OXYGEN SATURATION: 100 % | HEIGHT: 75 IN | WEIGHT: 264 LBS | SYSTOLIC BLOOD PRESSURE: 120 MMHG | RESPIRATION RATE: 16 BRPM

## 2024-07-02 DIAGNOSIS — Z23 NEED FOR VACCINATION: ICD-10-CM

## 2024-07-02 DIAGNOSIS — Z23 NEED FOR STREPTOCOCCUS PNEUMONIAE VACCINATION: ICD-10-CM

## 2024-07-02 DIAGNOSIS — Z12.11 SCREENING FOR COLORECTAL CANCER: ICD-10-CM

## 2024-07-02 DIAGNOSIS — Z09 HOSPITAL DISCHARGE FOLLOW-UP: Primary | ICD-10-CM

## 2024-07-02 DIAGNOSIS — Z12.12 SCREENING FOR COLORECTAL CANCER: ICD-10-CM

## 2024-07-02 DIAGNOSIS — I48.91 ATRIAL FIBRILLATION WITH RAPID VENTRICULAR RESPONSE (HCC): ICD-10-CM

## 2024-07-02 DIAGNOSIS — E66.9 OBESITY (BMI 30.0-34.9): ICD-10-CM

## 2024-07-02 DIAGNOSIS — R79.89 LOW TESTOSTERONE: ICD-10-CM

## 2024-07-02 PROBLEM — R94.31 ABNORMAL EKG: Status: RESOLVED | Noted: 2024-06-25 | Resolved: 2024-07-02

## 2024-07-02 PROBLEM — R73.01 ELEVATED FASTING GLUCOSE: Status: ACTIVE | Noted: 2024-06-05

## 2024-07-02 PROBLEM — E66.811 OBESITY (BMI 30.0-34.9): Status: ACTIVE | Noted: 2024-07-02

## 2024-07-02 ASSESSMENT — FIBROSIS 4 INDEX: FIB4 SCORE: 3.2

## 2024-07-05 LAB
LV EJECT FRACT  99904: 60
LV EJECT FRACT MOD 2C 99903: 58.62
LV EJECT FRACT MOD 4C 99902: 66.81
LV EJECT FRACT MOD BP 99901: 60.63

## 2024-07-05 PROCEDURE — 93306 TTE W/DOPPLER COMPLETE: CPT | Mod: 26 | Performed by: INTERNAL MEDICINE

## 2024-07-11 ENCOUNTER — APPOINTMENT (RX ONLY)
Dept: URBAN - METROPOLITAN AREA CLINIC 4 | Facility: CLINIC | Age: 64
Setting detail: DERMATOLOGY
End: 2024-07-11

## 2024-07-11 DIAGNOSIS — L82.1 OTHER SEBORRHEIC KERATOSIS: ICD-10-CM

## 2024-07-11 DIAGNOSIS — D18.0 HEMANGIOMA: ICD-10-CM

## 2024-07-11 DIAGNOSIS — L81.4 OTHER MELANIN HYPERPIGMENTATION: ICD-10-CM

## 2024-07-11 DIAGNOSIS — Z85.828 PERSONAL HISTORY OF OTHER MALIGNANT NEOPLASM OF SKIN: ICD-10-CM

## 2024-07-11 DIAGNOSIS — Z87.2 PERSONAL HISTORY OF DISEASES OF THE SKIN AND SUBCUTANEOUS TISSUE: ICD-10-CM

## 2024-07-11 DIAGNOSIS — Z71.89 OTHER SPECIFIED COUNSELING: ICD-10-CM

## 2024-07-11 DIAGNOSIS — D22 MELANOCYTIC NEVI: ICD-10-CM

## 2024-07-11 PROBLEM — D18.01 HEMANGIOMA OF SKIN AND SUBCUTANEOUS TISSUE: Status: ACTIVE | Noted: 2024-07-11

## 2024-07-11 PROBLEM — D22.5 MELANOCYTIC NEVI OF TRUNK: Status: ACTIVE | Noted: 2024-07-11

## 2024-07-11 PROBLEM — D48.5 NEOPLASM OF UNCERTAIN BEHAVIOR OF SKIN: Status: ACTIVE | Noted: 2024-07-11

## 2024-07-11 PROCEDURE — 11102 TANGNTL BX SKIN SINGLE LES: CPT

## 2024-07-11 PROCEDURE — ? COUNSELING

## 2024-07-11 PROCEDURE — ? DIAGNOSIS COMMENT

## 2024-07-11 PROCEDURE — ? BIOPSY BY SHAVE METHOD

## 2024-07-11 PROCEDURE — 99203 OFFICE O/P NEW LOW 30 MIN: CPT | Mod: 25

## 2024-07-11 ASSESSMENT — LOCATION DETAILED DESCRIPTION DERM
LOCATION DETAILED: RIGHT MID-UPPER BACK
LOCATION DETAILED: RIGHT SUPERIOR UPPER BACK
LOCATION DETAILED: RIGHT INFERIOR UPPER BACK
LOCATION DETAILED: LEFT INFERIOR MEDIAL BUCCAL CHEEK
LOCATION DETAILED: UPPER STERNUM
LOCATION DETAILED: RIGHT SUPERIOR MEDIAL UPPER BACK
LOCATION DETAILED: LEFT MEDIAL SUPERIOR CHEST
LOCATION DETAILED: LEFT MEDIAL MALAR CHEEK

## 2024-07-11 ASSESSMENT — LOCATION SIMPLE DESCRIPTION DERM
LOCATION SIMPLE: LEFT CHEEK
LOCATION SIMPLE: CHEST
LOCATION SIMPLE: RIGHT UPPER BACK

## 2024-07-11 ASSESSMENT — LOCATION ZONE DERM
LOCATION ZONE: FACE
LOCATION ZONE: TRUNK

## 2024-08-26 ENCOUNTER — HOSPITAL ENCOUNTER (EMERGENCY)
Facility: MEDICAL CENTER | Age: 64
End: 2024-08-26
Attending: EMERGENCY MEDICINE
Payer: MEDICARE

## 2024-08-26 ENCOUNTER — APPOINTMENT (OUTPATIENT)
Dept: RADIOLOGY | Facility: MEDICAL CENTER | Age: 64
End: 2024-08-26
Attending: EMERGENCY MEDICINE
Payer: MEDICARE

## 2024-08-26 VITALS
DIASTOLIC BLOOD PRESSURE: 63 MMHG | HEIGHT: 75 IN | HEART RATE: 78 BPM | SYSTOLIC BLOOD PRESSURE: 130 MMHG | BODY MASS INDEX: 32.65 KG/M2 | RESPIRATION RATE: 16 BRPM | WEIGHT: 262.57 LBS | TEMPERATURE: 98.6 F | OXYGEN SATURATION: 96 %

## 2024-08-26 DIAGNOSIS — R10.84 GENERALIZED ABDOMINAL PAIN: ICD-10-CM

## 2024-08-26 DIAGNOSIS — K74.60 CIRRHOSIS OF LIVER WITHOUT ASCITES, UNSPECIFIED HEPATIC CIRRHOSIS TYPE (HCC): ICD-10-CM

## 2024-08-26 DIAGNOSIS — M54.50 LOW BACK PAIN WITHOUT SCIATICA, UNSPECIFIED BACK PAIN LATERALITY, UNSPECIFIED CHRONICITY: ICD-10-CM

## 2024-08-26 DIAGNOSIS — R16.1 SPLENOMEGALY: ICD-10-CM

## 2024-08-26 DIAGNOSIS — Z98.1 ARTHRODESIS PRESENT: ICD-10-CM

## 2024-08-26 LAB
ALBUMIN SERPL BCP-MCNC: 4.2 G/DL (ref 3.2–4.9)
ALBUMIN/GLOB SERPL: 1.4 G/DL
ALP SERPL-CCNC: 123 U/L (ref 30–99)
ALT SERPL-CCNC: 43 U/L (ref 2–50)
ANION GAP SERPL CALC-SCNC: 11 MMOL/L (ref 7–16)
APPEARANCE UR: CLEAR
AST SERPL-CCNC: 52 U/L (ref 12–45)
BASOPHILS # BLD AUTO: 0.5 % (ref 0–1.8)
BASOPHILS # BLD: 0.04 K/UL (ref 0–0.12)
BILIRUB SERPL-MCNC: 2.1 MG/DL (ref 0.1–1.5)
BILIRUB UR QL STRIP.AUTO: NEGATIVE
BUN SERPL-MCNC: 8 MG/DL (ref 8–22)
CALCIUM ALBUM COR SERPL-MCNC: 9.1 MG/DL (ref 8.5–10.5)
CALCIUM SERPL-MCNC: 9.3 MG/DL (ref 8.5–10.5)
CHLORIDE SERPL-SCNC: 104 MMOL/L (ref 96–112)
CO2 SERPL-SCNC: 23 MMOL/L (ref 20–33)
COLOR UR: YELLOW
CREAT SERPL-MCNC: 0.8 MG/DL (ref 0.5–1.4)
EKG IMPRESSION: NORMAL
EOSINOPHIL # BLD AUTO: 0.02 K/UL (ref 0–0.51)
EOSINOPHIL NFR BLD: 0.3 % (ref 0–6.9)
ERYTHROCYTE [DISTWIDTH] IN BLOOD BY AUTOMATED COUNT: 45.8 FL (ref 35.9–50)
GFR SERPLBLD CREATININE-BSD FMLA CKD-EPI: 98 ML/MIN/1.73 M 2
GLOBULIN SER CALC-MCNC: 2.9 G/DL (ref 1.9–3.5)
GLUCOSE SERPL-MCNC: 116 MG/DL (ref 65–99)
GLUCOSE UR STRIP.AUTO-MCNC: NEGATIVE MG/DL
HCT VFR BLD AUTO: 47.3 % (ref 42–52)
HGB BLD-MCNC: 16.4 G/DL (ref 14–18)
IMM GRANULOCYTES # BLD AUTO: 0.02 K/UL (ref 0–0.11)
IMM GRANULOCYTES NFR BLD AUTO: 0.3 % (ref 0–0.9)
KETONES UR STRIP.AUTO-MCNC: NEGATIVE MG/DL
LEUKOCYTE ESTERASE UR QL STRIP.AUTO: NEGATIVE
LIPASE SERPL-CCNC: 18 U/L (ref 11–82)
LYMPHOCYTES # BLD AUTO: 0.98 K/UL (ref 1–4.8)
LYMPHOCYTES NFR BLD: 12.5 % (ref 22–41)
MCH RBC QN AUTO: 33 PG (ref 27–33)
MCHC RBC AUTO-ENTMCNC: 34.7 G/DL (ref 32.3–36.5)
MCV RBC AUTO: 95.2 FL (ref 81.4–97.8)
MICRO URNS: NORMAL
MONOCYTES # BLD AUTO: 0.97 K/UL (ref 0–0.85)
MONOCYTES NFR BLD AUTO: 12.4 % (ref 0–13.4)
NEUTROPHILS # BLD AUTO: 5.82 K/UL (ref 1.82–7.42)
NEUTROPHILS NFR BLD: 74 % (ref 44–72)
NITRITE UR QL STRIP.AUTO: NEGATIVE
NRBC # BLD AUTO: 0 K/UL
NRBC BLD-RTO: 0 /100 WBC (ref 0–0.2)
PH UR STRIP.AUTO: 7.5 [PH] (ref 5–8)
PLATELET # BLD AUTO: 118 K/UL (ref 164–446)
PMV BLD AUTO: 10.2 FL (ref 9–12.9)
POTASSIUM SERPL-SCNC: 4.2 MMOL/L (ref 3.6–5.5)
PROT SERPL-MCNC: 7.1 G/DL (ref 6–8.2)
PROT UR QL STRIP: NEGATIVE MG/DL
RBC # BLD AUTO: 4.97 M/UL (ref 4.7–6.1)
RBC UR QL AUTO: NEGATIVE
SODIUM SERPL-SCNC: 138 MMOL/L (ref 135–145)
SP GR UR STRIP.AUTO: 1.02
TROPONIN T SERPL-MCNC: 15 NG/L (ref 6–19)
UROBILINOGEN UR STRIP.AUTO-MCNC: 1 MG/DL
WBC # BLD AUTO: 7.9 K/UL (ref 4.8–10.8)

## 2024-08-26 PROCEDURE — 700111 HCHG RX REV CODE 636 W/ 250 OVERRIDE (IP): Mod: JZ | Performed by: EMERGENCY MEDICINE

## 2024-08-26 PROCEDURE — 99285 EMERGENCY DEPT VISIT HI MDM: CPT

## 2024-08-26 PROCEDURE — 36415 COLL VENOUS BLD VENIPUNCTURE: CPT

## 2024-08-26 PROCEDURE — 84484 ASSAY OF TROPONIN QUANT: CPT

## 2024-08-26 PROCEDURE — 96375 TX/PRO/DX INJ NEW DRUG ADDON: CPT

## 2024-08-26 PROCEDURE — 96374 THER/PROPH/DIAG INJ IV PUSH: CPT | Mod: XU

## 2024-08-26 PROCEDURE — 81003 URINALYSIS AUTO W/O SCOPE: CPT

## 2024-08-26 PROCEDURE — 72131 CT LUMBAR SPINE W/O DYE: CPT

## 2024-08-26 PROCEDURE — 85025 COMPLETE CBC W/AUTO DIFF WBC: CPT

## 2024-08-26 PROCEDURE — 83690 ASSAY OF LIPASE: CPT

## 2024-08-26 PROCEDURE — 93005 ELECTROCARDIOGRAM TRACING: CPT | Performed by: EMERGENCY MEDICINE

## 2024-08-26 PROCEDURE — 74177 CT ABD & PELVIS W/CONTRAST: CPT

## 2024-08-26 PROCEDURE — 700117 HCHG RX CONTRAST REV CODE 255: Performed by: EMERGENCY MEDICINE

## 2024-08-26 PROCEDURE — 80053 COMPREHEN METABOLIC PANEL: CPT

## 2024-08-26 RX ORDER — ONDANSETRON 2 MG/ML
4 INJECTION INTRAMUSCULAR; INTRAVENOUS ONCE
Status: COMPLETED | OUTPATIENT
Start: 2024-08-26 | End: 2024-08-26

## 2024-08-26 RX ADMIN — ONDANSETRON 4 MG: 2 INJECTION INTRAMUSCULAR; INTRAVENOUS at 11:46

## 2024-08-26 RX ADMIN — FENTANYL CITRATE 50 MCG: 50 INJECTION, SOLUTION INTRAMUSCULAR; INTRAVENOUS at 11:48

## 2024-08-26 RX ADMIN — IOHEXOL 100 ML: 350 INJECTION, SOLUTION INTRAVENOUS at 13:00

## 2024-08-26 ASSESSMENT — FIBROSIS 4 INDEX: FIB4 SCORE: 3.2

## 2024-08-26 NOTE — ED NOTES
IV Dc'd with cannula intact.  Reviewed DC instructions with pt, understanding verbalized.  Pt left amb, gait steady.  Pt left with his wife.

## 2024-08-26 NOTE — ED PROVIDER NOTES
ED Provider Note    CHIEF COMPLAINT  Chief Complaint   Patient presents with    Abdominal Pain     RLQ pain x 3 months.     Low Back Pain     Right side back pain.        EXTERNAL RECORDS REVIEWED  Other no pertinent past medical history on recent chart review  Other than A-fib with RVR status post cardioversion    HPI/ROS  LIMITATION TO HISTORY   Select: : None  OUTSIDE HISTORIAN(S):  Significant other wife at bedside providing additional history    Yunior Ambriz is a 64 y.o. male who presents to the emergency department complaining of persistent back pain as well as upper abdominal pain.  Past medical history significant for cardiac arrhythmia.  Currently on Eliquis and amlodipine.  Recently relocated from [facility] to [place].  Has established care with outpatient PCP.  Was seen by the PCP and had outpatient workup to include hematologic evaluation and x-ray imaging of the low back which was largely unremarkable.    Now over this weekend he has had escalating pain especially in his upper abdomen.  For this reason he decided to come to the emergency department.  States that the pain waxes and wanes.  Does become severe but currently more mild.  Does have some aggravation with certain movements.  No change in diet.  No change in bowel or bladder output.    Does have history of prior negative colonoscopies but has never had an endoscopy.      PAST MEDICAL HISTORY       SURGICAL HISTORY  patient denies any surgical history    FAMILY HISTORY  History reviewed. No pertinent family history.    SOCIAL HISTORY  Social History     Tobacco Use    Smoking status: Never    Smokeless tobacco: Never   Vaping Use    Vaping status: Never Used   Substance and Sexual Activity    Alcohol use: Yes     Alcohol/week: 8.4 oz     Types: 14 Glasses of wine per week    Drug use: Never    Sexual activity: Not on file       CURRENT MEDICATIONS  Home Medications       Reviewed by Shayla Aceves R.N. (Registered Nurse) on  "08/26/24 at 0900  Med List Status: Partial     Medication Last Dose Status   amiodarone (CORDARONE) 200 MG Tab  Active   azelastine (ASTELIN) 137 MCG/SPRAY nasal spray  Active   ELIQUIS 5 MG Tab  Active   fluticasone (FLONASE) 50 MCG/ACT nasal spray  Active   metoprolol SR (TOPROL XL) 100 MG TABLET SR 24 HR  Active                    ALLERGIES  No Known Allergies    PHYSICAL EXAM  VITAL SIGNS: /63   Pulse 78   Temp 37 °C (98.6 °F) (Temporal)   Resp 16   Ht 1.905 m (6' 3\")   Wt 119 kg (262 lb 9.1 oz)   SpO2 96%   BMI 32.82 kg/m²      Pulse ox interpretation: I interpret this pulse ox as normal.  Constitutional: Alert in no apparent distress.  HENT: No signs of trauma, Bilateral external ears normal, Nose normal.   Eyes: Pupils are equal and reactive  Neck: Normal range of motion, No tenderness, Supple  Cardiovascular: Regular rate and rhythm, no murmurs.   Thorax & Lungs: Normal breath sounds, No respiratory distress, No wheezing, No chest tenderness.   Abdomen: Bowel sounds normal, Soft, diffuse upper abdominal tenderness  Skin: Warm, Dry, No erythema, No rash.   Back: No bony tenderness, No CVA tenderness.   Musculoskeletal: Good range of motion in all major joints. No tenderness to palpation or major deformities noted.   Neurologic: Alert , Normal motor function, Normal sensory function, No focal deficits noted.   Psychiatric: Affect normal, Judgment normal, Mood normal.         EKG/LABS  Results for orders placed or performed during the hospital encounter of 08/26/24   CBC WITH DIFFERENTIAL   Result Value Ref Range    WBC 7.9 4.8 - 10.8 K/uL    RBC 4.97 4.70 - 6.10 M/uL    Hemoglobin 16.4 14.0 - 18.0 g/dL    Hematocrit 47.3 42.0 - 52.0 %    MCV 95.2 81.4 - 97.8 fL    MCH 33.0 27.0 - 33.0 pg    MCHC 34.7 32.3 - 36.5 g/dL    RDW 45.8 35.9 - 50.0 fL    Platelet Count 118 (L) 164 - 446 K/uL    MPV 10.2 9.0 - 12.9 fL    Neutrophils-Polys 74.00 (H) 44.00 - 72.00 %    Lymphocytes 12.50 (L) 22.00 - 41.00 % "    Monocytes 12.40 0.00 - 13.40 %    Eosinophils 0.30 0.00 - 6.90 %    Basophils 0.50 0.00 - 1.80 %    Immature Granulocytes 0.30 0.00 - 0.90 %    Nucleated RBC 0.00 0.00 - 0.20 /100 WBC    Neutrophils (Absolute) 5.82 1.82 - 7.42 K/uL    Lymphs (Absolute) 0.98 (L) 1.00 - 4.80 K/uL    Monos (Absolute) 0.97 (H) 0.00 - 0.85 K/uL    Eos (Absolute) 0.02 0.00 - 0.51 K/uL    Baso (Absolute) 0.04 0.00 - 0.12 K/uL    Immature Granulocytes (abs) 0.02 0.00 - 0.11 K/uL    NRBC (Absolute) 0.00 K/uL   COMP METABOLIC PANEL   Result Value Ref Range    Sodium 138 135 - 145 mmol/L    Potassium 4.2 3.6 - 5.5 mmol/L    Chloride 104 96 - 112 mmol/L    Co2 23 20 - 33 mmol/L    Anion Gap 11.0 7.0 - 16.0    Glucose 116 (H) 65 - 99 mg/dL    Bun 8 8 - 22 mg/dL    Creatinine 0.80 0.50 - 1.40 mg/dL    Calcium 9.3 8.5 - 10.5 mg/dL    Correct Calcium 9.1 8.5 - 10.5 mg/dL    AST(SGOT) 52 (H) 12 - 45 U/L    ALT(SGPT) 43 2 - 50 U/L    Alkaline Phosphatase 123 (H) 30 - 99 U/L    Total Bilirubin 2.1 (H) 0.1 - 1.5 mg/dL    Albumin 4.2 3.2 - 4.9 g/dL    Total Protein 7.1 6.0 - 8.2 g/dL    Globulin 2.9 1.9 - 3.5 g/dL    A-G Ratio 1.4 g/dL   LIPASE   Result Value Ref Range    Lipase 18 11 - 82 U/L   URINALYSIS    Specimen: Urine, Clean Catch   Result Value Ref Range    Color Yellow     Character Clear     Specific Gravity 1.016 <1.035    Ph 7.5 5.0 - 8.0    Glucose Negative Negative mg/dL    Ketones Negative Negative mg/dL    Protein Negative Negative mg/dL    Bilirubin Negative Negative    Urobilinogen, Urine 1.0 Negative    Nitrite Negative Negative    Leukocyte Esterase Negative Negative    Occult Blood Negative Negative    Micro Urine Req see below    ESTIMATED GFR   Result Value Ref Range    GFR (CKD-EPI) 98 >60 mL/min/1.73 m 2   TROPONIN   Result Value Ref Range    Troponin T 15 6 - 19 ng/L       I have independently interpreted this EKG    RADIOLOGY/PROCEDURES   I have independently interpreted the diagnostic imaging associated with this visit and  am waiting the final reading from the radiologist.   My preliminary interpretation is as follows: No large obstruction noted    Radiologist interpretation:  CT-LSPINE W/O PLUS RECONS   Final Result      1. Arthrodesis of the right sacroiliac joint.   2. The remainder of the CT of the lumbar spine is within normal limits.      CT-ABDOMEN-PELVIS WITH   Final Result      1. No acute inflammatory process in the abdomen or pelvis.   2. Normal appendix.   3. Cirrhotic morphology of the liver, with splenomegaly.   4. Simple appearing upper pole right renal cortical cyst. No further imaging follow-up is warranted as per consensus guidelines based on imaging criteria.          COURSE & MEDICAL DECISION MAKING    ASSESSMENT, COURSE AND PLAN  Care Narrative: 64-year-old presenting to the emerged part with abdominal pain.  History as above.  Will consider broad supra and infradiaphragmatic pathologies.    DISPOSITION AND DISCUSSIONS  I have discussed management of the patient with the following physicians and TERRY's: None    Discussion of management with other QHP or appropriate source(s): None     Escalation of care considered, and ultimately not performed:acute inpatient care management, however at this time, the patient is most appropriate for outpatient management    Barriers to care at this time, including but not limited to:  None .     64-year-old male presenting to the emergency room with above presentation.  Workup is reassuring.  White count is negative.  Urinalysis negative.  Troponin negative.  Heart score is low.  Patient has a known history of hepatic inflammatory states and has had downtrending LFTs of recent.  There is persistent finding of cirrhosis on CT today.  Also slight liver enlargement.  Low concern for mono as the etiology of his current symptoms.  Will defer from any further ER or inpatient care and workup.  Will refer back to PCP as well as GI locally as he is new to [facility].  In the interim he will  return here to the ER with any change or worsening or reoccurrence.        FINAL DIAGNOSIS  1. Generalized abdominal pain    2. Low back pain without sciatica, unspecified back pain laterality, unspecified chronicity    3. Arthrodesis present    4. Cirrhosis of liver without ascites, unspecified hepatic cirrhosis type (HCC)    5. Splenomegaly         Electronically signed by: Julio Cesar Bhatia M.D., 8/26/2024 11:32 AM

## 2024-08-26 NOTE — ED TRIAGE NOTES
Chief Complaint   Patient presents with    Abdominal Pain     RLQ pain x 3 months.     Low Back Pain     Right side back pain.

## 2024-09-04 ENCOUNTER — APPOINTMENT (OUTPATIENT)
Dept: MEDICAL GROUP | Facility: MEDICAL CENTER | Age: 64
End: 2024-09-04
Payer: MEDICARE

## 2024-09-04 VITALS
TEMPERATURE: 97.2 F | RESPIRATION RATE: 16 BRPM | WEIGHT: 262.2 LBS | SYSTOLIC BLOOD PRESSURE: 112 MMHG | HEIGHT: 75 IN | HEART RATE: 53 BPM | DIASTOLIC BLOOD PRESSURE: 50 MMHG | OXYGEN SATURATION: 96 % | BODY MASS INDEX: 32.6 KG/M2

## 2024-09-04 DIAGNOSIS — E66.9 OBESITY (BMI 30.0-34.9): ICD-10-CM

## 2024-09-04 DIAGNOSIS — R07.81 RIB PAIN ON RIGHT SIDE: ICD-10-CM

## 2024-09-04 PROCEDURE — 3074F SYST BP LT 130 MM HG: CPT | Performed by: PHYSICIAN ASSISTANT

## 2024-09-04 PROCEDURE — 99214 OFFICE O/P EST MOD 30 MIN: CPT | Performed by: PHYSICIAN ASSISTANT

## 2024-09-04 PROCEDURE — 3078F DIAST BP <80 MM HG: CPT | Performed by: PHYSICIAN ASSISTANT

## 2024-09-04 RX ORDER — CYCLOBENZAPRINE HCL 5 MG
5 TABLET ORAL 2 TIMES DAILY PRN
Qty: 60 TABLET | Refills: 1 | Status: SHIPPED | OUTPATIENT
Start: 2024-09-04 | End: 2024-09-25

## 2024-09-04 RX ORDER — TIRZEPATIDE 2.5 MG/.5ML
2.5 INJECTION, SOLUTION SUBCUTANEOUS
Qty: 2 ML | Refills: 1 | Status: SHIPPED | OUTPATIENT
Start: 2024-09-04 | End: 2024-10-02

## 2024-09-04 RX ORDER — TIRZEPATIDE 2.5 MG/.5ML
2.5 INJECTION, SOLUTION SUBCUTANEOUS
Qty: 2 ML | Refills: 1 | Status: SHIPPED | OUTPATIENT
Start: 2024-09-04 | End: 2024-09-04

## 2024-09-04 ASSESSMENT — FIBROSIS 4 INDEX: FIB4 SCORE: 4.37

## 2024-09-04 NOTE — PROGRESS NOTES
Subjective:     History of Present Illness  The patient presents for evaluation of multiple medical concerns. He is accompanied by his wife.    He reports experiencing a sensation in his back, which was previously investigated with an x-ray that showed no abnormalities. Despite this, he continues to experience intermittent discomfort, likening it to being hit with a baseball bat. The pain, which he rates as 2 out of 10, is persistent and throbbing, occasionally intensifying to a stabbing sensation. This led him to seek emergency care, where he received a pain injection that provided temporary relief. However, the pain returned, particularly noticeable when he turns in bed. He has not experienced any recent injury but suspects a possible muscle strain. He also mentions a history of herniated disc and expresses concern about potential tumor growth that was alleviated with the CT abdomen.    He is interested in exploring weight loss medication options, as his wife has had success with one. He plans to join a gym. He also mentions a reduction in hard alcohol consumption, although he still enjoys a few glasses of red wine.      Current medicines (including changes today)  Current Outpatient Medications   Medication Sig Dispense Refill    cyclobenzaprine (FLEXERIL) 5 mg tablet Take 1 Tablet by mouth 2 times a day as needed for Moderate Pain for up to 30 days. 60 Tablet 1    Tirzepatide-Weight Management (ZEPBOUND) 2.5 MG/0.5ML Solution Auto-injector Inject 2.5 mg under the skin every 7 days for 28 days. 2 mL 1    ELIQUIS 5 MG Tab Take 5 mg by mouth 2 times a day.      azelastine (ASTELIN) 137 MCG/SPRAY nasal spray Administer 2 Sprays into affected nostril(S) 2 times a day.      fluticasone (FLONASE) 50 MCG/ACT nasal spray Administer 1 Spray into affected nostril(S) 2 times a day.      metoprolol SR (TOPROL XL) 100 MG TABLET SR 24 HR Take 100 mg by mouth 2 times a day.      amiodarone (CORDARONE) 200 MG Tab Take 100 mg by  "mouth every Monday, Wednesday, and Friday. .5 tab = 100 mg  Indications: Atrial Fibrillation       No current facility-administered medications for this visit.     He  has no past medical history on file.    ROS   No chest pain, no shortness of breath.  Positive ROS as per HPI.  All other systems reviewed and are negative.     Objective:     /50 (BP Location: Left arm, Patient Position: Sitting, BP Cuff Size: Adult)   Pulse (!) 53   Temp 36.2 °C (97.2 °F) (Temporal)   Resp 16   Ht 1.905 m (6' 3\")   Wt 119 kg (262 lb 3.2 oz)   SpO2 96%  Body mass index is 32.77 kg/m².   Physical Exam    Constitutional: Alert, no distress.  Skin: Warm, dry, good turgor, no rashes in visible areas.  Eye: Equal, round and reactive, conjunctiva clear, lids normal.  ENMT: Lips without lesions, good dentition, oropharynx clear.  Neck: Trachea midline, no masses, no thyromegaly.  Psych: Alert and oriented x3, normal affect and mood.      Results  Imaging  X-ray of the back was clear.        Assessment and Plan:   The following treatment plan was discussed    Assessment & Plan  1. Chronic right-sided rib pain.  The pain extends to the back and recently to the right side of the stomach, appearing to be musculoskeletal in nature, likely due to a muscle spasm. It worsens with movement at night. Emergency room notes and imaging were reviewed. Flexeril 5 mg was prescribed to be taken as needed to alleviate potential spasms and relax the abdomen. If 5 mg is not effective, he can double the dose to 10 mg. A referral was made to pain management for further consultation with Dr. Spence on the cause of the pain.    2. Obesity.  This is a chronic condition. An initial dose of Mounjaro 2.5 mg weekly was prescribed. He was advised to report back after 3 weeks for a potential dose increase to 5 mg. He will likely need to utilize the currently offered coupon. The prescription was sent to [facility].        ORDERS:  1. Rib pain on right side    - " cyclobenzaprine (FLEXERIL) 5 mg tablet; Take 1 Tablet by mouth 2 times a day as needed for Moderate Pain for up to 30 days.  Dispense: 60 Tablet; Refill: 1  - Referral to Pain Management    2. Obesity (BMI 30.0-34.9)    - Tirzepatide-Weight Management (ZEPBOUND) 2.5 MG/0.5ML Solution Auto-injector; Inject 2.5 mg under the skin every 7 days for 28 days.  Dispense: 2 mL; Refill: 1        Please note that this dictation was created using voice recognition software. I have made every reasonable attempt to correct obvious errors, but I expect that there are errors of grammar and possibly content that I did not discover before finalizing the note.      Attestation      Verbal consent was acquired by the patient to use [employer] ambient listening note generation during this visit Yes

## 2024-09-06 ENCOUNTER — OFFICE VISIT (OUTPATIENT)
Dept: PHYSICAL MEDICINE AND REHAB | Facility: MEDICAL CENTER | Age: 64
End: 2024-09-06
Payer: MEDICARE

## 2024-09-06 VITALS
DIASTOLIC BLOOD PRESSURE: 58 MMHG | OXYGEN SATURATION: 95 % | BODY MASS INDEX: 32.89 KG/M2 | TEMPERATURE: 97.1 F | WEIGHT: 264.55 LBS | HEIGHT: 75 IN | SYSTOLIC BLOOD PRESSURE: 112 MMHG | HEART RATE: 62 BPM

## 2024-09-06 DIAGNOSIS — Z71.82 EXERCISE COUNSELING: ICD-10-CM

## 2024-09-06 DIAGNOSIS — M47.9 SPONDYLOSIS: ICD-10-CM

## 2024-09-06 DIAGNOSIS — R07.81 RIB PAIN ON RIGHT SIDE: ICD-10-CM

## 2024-09-06 DIAGNOSIS — M47.816 FACET ARTHRITIS OF LUMBAR REGION: ICD-10-CM

## 2024-09-06 DIAGNOSIS — M99.08 SOMATIC DYSFUNCTION OF RIB: ICD-10-CM

## 2024-09-06 DIAGNOSIS — E66.9 OBESITY (BMI 30-39.9): ICD-10-CM

## 2024-09-06 PROCEDURE — 3078F DIAST BP <80 MM HG: CPT | Performed by: GENERAL PRACTICE

## 2024-09-06 PROCEDURE — 99204 OFFICE O/P NEW MOD 45 MIN: CPT | Performed by: GENERAL PRACTICE

## 2024-09-06 PROCEDURE — 1125F AMNT PAIN NOTED PAIN PRSNT: CPT | Performed by: GENERAL PRACTICE

## 2024-09-06 PROCEDURE — 3074F SYST BP LT 130 MM HG: CPT | Performed by: GENERAL PRACTICE

## 2024-09-06 PROCEDURE — G2211 COMPLEX E/M VISIT ADD ON: HCPCS | Performed by: GENERAL PRACTICE

## 2024-09-06 ASSESSMENT — FIBROSIS 4 INDEX: FIB4 SCORE: 4.37

## 2024-09-06 ASSESSMENT — PAIN SCALES - GENERAL: PAINLEVEL: 6=MODERATE PAIN

## 2024-09-06 NOTE — PROGRESS NOTES
Physiatry (Physical Medicine and  Rehabilitation)       Patient Name: Yunior Ambriz   Patient : 1960  PCP: Leoncio Grider P.A.-C.  MRN: [id number]     Date of service: See epic    Referring provider: Leoncio Grider P.A.-*    CHIEF COMPLAINT  Chief Complaint   Patient presents with    Establish Care     R- Side Rib Pain         Yunior Ambriz is a 64 y.o. very pleasant male  who presents today with Diagnoses of Rib pain on right side, Spondylosis, Facet arthritis of lumbar region, Exercise counseling, Obesity (BMI 30-39.9), and Somatic dysfunction of rib were pertinent to this visit.      Medical records review:  I reviewed the note from the referring provider Leoncio Grider P.A.-* including the note dated 2024.    Prior Relevant MSK/Interventional Procedures:     HPI:    2024 acute on chronic right rib pain.  Described as sharp and aching. no radiation.  Pain right now is 5/10 on the numeric pain scale. His pain at its best-worse level during the course of the day is typically 4-6/10, respectively.  Pain improves with sitting, standing, walking, and walking upstairs and worsens with side bending or twisting, laying down, coughing, and sneezing. His pain interferes somewhat with ADLs.   No recent injury, falls, or trauma.  Associated symptoms of radiation across back to his sternum.  No associated symptoms of respiratory distress or hemoptysis or phlegm or coughing.  The patient has not done a provider driven physical therapy program for this problem.    Patient has tried the following medications:  Flexeril 10 mg effective.    Previous or Current Therapeutic modalities and interventional therapies:  -Topicals:?yes, inconsistently      ROS:   Fever, Chills, Sweats: Denies  Involuntary Weight Loss: Denies  Bowel/bladder issues: denies   See HPI.   All other systems reviewed and negative.     OCCUPATIONAL history: Former business owner  HOBBIES/ACTIVITIES: former [employer]  [employer]  & [employer].     GOALS OF TREATMENT: Symptom/Pain relief. improve function.      Psychological testing for pain as depression and pain commonly coexist and need to both be treated.     Opioid Risk Score: No value filed.      Interpretation of Opioid Risk Score   Score 0-3 = Low risk of abuse. Do UDS at least once per year.  Score 4-7 = Moderate risk of abuse. Do UDS 1-4 times per year.  Score 8+ = High risk of abuse. Refer to specialist.    PHQ      6/5/2024     1:40 PM 6/25/2024     7:12 PM   Depression Screen (PHQ-2/PHQ-9)   PHQ-2 Total Score  0   PHQ-2 Total Score 0        Interpretation of PHQ-9 Total Score   Score Severity   1-4 No Depression   5-9 Mild Depression   10-14 Moderate Depression   15-19 Moderately Severe Depression   20-27 Severe Depression      PMHx:   History reviewed. No pertinent past medical history.    PSHx:   History reviewed. No pertinent surgical history.    Family history   History reviewed. No pertinent family history.    Medications: reviewed on epic.   Outpatient Medications Marked as Taking for the 9/6/24 encounter (Office Visit) with Michael Davidson D.O.   Medication Sig Dispense Refill    cyclobenzaprine (FLEXERIL) 5 mg tablet Take 1 Tablet by mouth 2 times a day as needed for Moderate Pain for up to 30 days. 60 Tablet 1    Tirzepatide-Weight Management (ZEPBOUND) 2.5 MG/0.5ML Solution Auto-injector Inject 2.5 mg under the skin every 7 days for 28 days. 2 mL 1    ELIQUIS 5 MG Tab Take 5 mg by mouth 2 times a day.      azelastine (ASTELIN) 137 MCG/SPRAY nasal spray Administer 2 Sprays into affected nostril(S) 2 times a day.      fluticasone (FLONASE) 50 MCG/ACT nasal spray Administer 1 Spray into affected nostril(S) 2 times a day.      metoprolol SR (TOPROL XL) 100 MG TABLET SR 24 HR Take 100 mg by mouth 2 times a day.      amiodarone (CORDARONE) 200 MG Tab Take 100 mg by mouth every Monday, Wednesday, and Friday. .5 tab = 100 mg  Indications: Atrial Fibrillation          Allergies:   No  "Known Allergies    Social Hx:   Social History     Socioeconomic History    Marital status:      Spouse name: Not on file    Number of children: Not on file    Years of education: Not on file    Highest education level: Not on file   Occupational History    Not on file   Tobacco Use    Smoking status: Never    Smokeless tobacco: Never   Vaping Use    Vaping status: Never Used   Substance and Sexual Activity    Alcohol use: Yes     Alcohol/week: 8.4 oz     Types: 14 Glasses of wine per week    Drug use: Never    Sexual activity: Not on file   Other Topics Concern    Not on file   Social History Narrative    Not on file     Social Determinants of Health     Financial Resource Strain: Not on file   Food Insecurity: Not on file   Transportation Needs: Not on file   Physical Activity: Not on file   Stress: Not on file   Social Connections: Not on file   Intimate Partner Violence: Not on file   Housing Stability: Not on file         EXAMINATION   Vitals: /58 (BP Location: Right arm, Patient Position: Sitting, BP Cuff Size: Large adult)   Pulse 62   Temp 36.2 °C (97.1 °F) (Temporal)   Ht 1.905 m (6' 3\")   Wt 120 kg (264 lb 8.8 oz)   SpO2 95%   Physical Exam:     Body Habitus: Body mass index is 33.07 kg/m².  Appearance: Well-groomed, well-nourished, not disheveled  Eyes: No scleral icterus to suggest severe liver disease, no proptosis to suggest severe hyperthyroid  ENT -no obvious auditory deficits, no external lesions, moist mucus membranes   Skin -no rashes or lesions noted. No appreciable skin breakdown on exposed skin areas.    Respiratory-  breathing comfortably on room air, no audible wheezing, full sentences  Cardiovascular- No lower extremity edema noted.   Psychiatric- alert and oriented, calm, comfortable, cooperative     Musculoskeletal and Neuro:  Gait and station - normal gait with reciprocal pattern,  no presence/use of ambulatory device, no arm assistance with sit-to-stand, nonantalgic. no " loss of balance during exam.  No change in patient's demeanor with exam.    Grossly normal cranial nerve exam  Coordination grossly intact  Moving upper extremities against gravity     Thoracic/Lumbar Spine/Sacral Spine/Hips   Inspection: No evidence of atrophy in bilateral lower extremities throughout     ROM: full  active range of motion with flexion, lateral flexion, and rotation bilaterally.   There is full  active range of motion with lumbar extension without pain.    Palpation:   No tenderness to palpation in midline at T1-T12 levels. No tenderness to palpation in the left and right of the midline T1-L5  Tenderness to palpation intercostal space right side.  Insertion of rib causes pain     Lumbar spine Special tests  Neuro tension   Slump test} negative bilaterally    HIP  FAIR test negative bilaterally   Range of motion in the RIGHT hip is decreased in flexion, extension, abduction, internal rotation, external rotation.  Range of motion in the LEFT hip is decreased in flexion, extension, abduction, internal rotation, external rotation.  Jac negative bilaterally  Hamstring tightness with forward flexion     SI joint tests  Observation patient sits on one buttocks: Negative    Key points for the international standards for neurological classification of spinal cord injury (ISNCSCI) to light touch.   Dermatome R L   L2 2 2   L3 2 2   L4 2 2   L5 2 2   S1 2 2     Motor Exam Lower Extremities  ? Myotome R L   Hip flexion L2 5 5   Knee extension L3 5 5   Ankle dorsiflexion L4 5 5   Toe extension L5 5 5   Ankle plantarflexion S1 5 5                 Reflexes   ? R L   Patella 2+ 2+          MEDICAL DECISION MAKING    Medical records review: see under HPI section.     DATA    Labs:   Lab Results   Component Value Date/Time    SODIUM 138 08/26/2024 09:06 AM    POTASSIUM 4.2 08/26/2024 09:06 AM    CHLORIDE 104 08/26/2024 09:06 AM    CO2 23 08/26/2024 09:06 AM    ANION 11.0 08/26/2024 09:06 AM    GLUCOSE 116 (H)  2024 09:06 AM    BUN 8 2024 09:06 AM    CREATININE 0.80 2024 09:06 AM    CALCIUM 9.3 2024 09:06 AM    ASTSGOT 52 (H) 2024 09:06 AM    ALTSGPT 43 2024 09:06 AM    TBILIRUBIN 2.1 (H) 2024 09:06 AM    ALBUMIN 4.2 2024 09:06 AM    TOTPROTEIN 7.1 2024 09:06 AM    GLOBULIN 2.9 2024 09:06 AM    AGRATIO 1.4 2024 09:06 AM   ]    Lab Results   Component Value Date/Time    PROTHROMBTM 16.6 (H) 2024 03:10 PM    INR 1.33 (H) 2024 03:10 PM        Lab Results   Component Value Date/Time    WBC 7.9 2024 09:06 AM    RBC 4.97 2024 09:06 AM    HEMOGLOBIN 16.4 2024 09:06 AM    HEMATOCRIT 47.3 2024 09:06 AM    MCV 95.2 2024 09:06 AM    MCH 33.0 2024 09:06 AM    MCHC 34.7 2024 09:06 AM    MPV 10.2 2024 09:06 AM    NEUTSPOLYS 74.00 (H) 2024 09:06 AM    LYMPHOCYTES 12.50 (L) 2024 09:06 AM    MONOCYTES 12.40 2024 09:06 AM    EOSINOPHILS 0.30 2024 09:06 AM    BASOPHILS 0.50 2024 09:06 AM        Lab Results   Component Value Date/Time    HBA1C 5.2 2024 03:43 PM        Imaging:   I personally reviewed following images, these are my reads  Ribs 2024: No obvious acute osseous abnormality  Portable chest 2024: No acute obvious osseous abnormality    CT L-spine 2024: Degenerative changes with flowing osteophyte    IMAGING radiology reads. I reviewed the following radiology reads                                                                                       Results for orders placed in visit on 24    [id number] (WITH 1-VIEW CXR) RIGHT    Impression  No displaced rib fracture.                       ASSESSMENT AND PLAN:  Yunior Ambriz   : 1960   Past medical history of BMI 32, type II NSTEMI, seasonal allergies, elevated fasting glucose level, chronic atrial fibrillation, chronic anticoagulation    Diagnoses and all orders for this visit:  1.  Rib pain on right side        2. Spondylosis        3. Facet arthritis of lumbar region        4. Exercise counseling        5. Obesity (BMI 30-39.9)        6. Somatic dysfunction of rib            Patient with historical and clinical evidence consistent with right rib somatic dysfunction with myalgia as reproducible pain with palpation and with manipulation of the rib.  Imaging did not reveal fractures or any acute osseous abnormalities.  Patient would like to proceed with conservative management plan  -Dietary and exercising counseling discussed.  Extensive discussion regarding treatment options, at this time recommending the following:    No orders of the defined types were placed in this encounter.      -Medications/Modalities:   You may also try ice packs or heating pads to help with pain for no more than 15 minutes at a time several times a day and monitor the skin for changes, Use of topical agents (diclofenac, Lidoderm, lidocaine), and Short trial of Acetaminophen as needed (patient denies liver disease)  -Therapy (PT/OT/Aquatherapy): HEP  -Home exercise program: encouraged and provided home exercises of regular strengthening and stretching.    -Diagnostic workup: reviewed today as above;    -Interventional program: Declined by patient as we will trial conservative plan.  Will  consider in the future  -Referrals: none required at this time    Follow-up:   Return to clinic in 12 weeks for follow-up of symptomatology  Patient expressed understanding of the management plan. Patient (and Family Members) was/were encouraged to call if any worries, issues, problems or concerns prior to the next visit     Please note that this dictation was created using voice recognition software. I have made every reasonable attempt to correct obvious errors but there may be errors of grammar and content that I may have overlooked prior to finalization of this note.      Michael Davidson, DO  Physical Medicine and  Rehabilitation  [facility]         MAYA Grider P.A.-C.   CC Leoncio Grider P.A.-*

## 2024-09-25 ENCOUNTER — OFFICE VISIT (OUTPATIENT)
Dept: CARDIOLOGY | Facility: MEDICAL CENTER | Age: 64
End: 2024-09-25
Attending: INTERNAL MEDICINE
Payer: MEDICARE

## 2024-09-25 VITALS
BODY MASS INDEX: 33.22 KG/M2 | HEART RATE: 63 BPM | SYSTOLIC BLOOD PRESSURE: 124 MMHG | DIASTOLIC BLOOD PRESSURE: 64 MMHG | RESPIRATION RATE: 16 BRPM | OXYGEN SATURATION: 97 % | WEIGHT: 267.2 LBS | HEIGHT: 75 IN

## 2024-09-25 DIAGNOSIS — I48.0 PAROXYSMAL ATRIAL FIBRILLATION (HCC): ICD-10-CM

## 2024-09-25 DIAGNOSIS — E78.00 PURE HYPERCHOLESTEROLEMIA: ICD-10-CM

## 2024-09-25 DIAGNOSIS — I10 HTN (HYPERTENSION), MALIGNANT: ICD-10-CM

## 2024-09-25 DIAGNOSIS — I51.7 LEFT VENTRICULAR HYPERTROPHY: ICD-10-CM

## 2024-09-25 DIAGNOSIS — D68.69 HYPERCOAGULABLE STATE DUE TO PAROXYSMAL ATRIAL FIBRILLATION (HCC): ICD-10-CM

## 2024-09-25 DIAGNOSIS — I48.0 HYPERCOAGULABLE STATE DUE TO PAROXYSMAL ATRIAL FIBRILLATION (HCC): ICD-10-CM

## 2024-09-25 LAB — EKG IMPRESSION: NORMAL

## 2024-09-25 PROCEDURE — 93005 ELECTROCARDIOGRAM TRACING: CPT | Performed by: INTERNAL MEDICINE

## 2024-09-25 PROCEDURE — 3078F DIAST BP <80 MM HG: CPT | Performed by: INTERNAL MEDICINE

## 2024-09-25 PROCEDURE — 93010 ELECTROCARDIOGRAM REPORT: CPT | Performed by: INTERNAL MEDICINE

## 2024-09-25 PROCEDURE — G2211 COMPLEX E/M VISIT ADD ON: HCPCS | Performed by: INTERNAL MEDICINE

## 2024-09-25 PROCEDURE — 99213 OFFICE O/P EST LOW 20 MIN: CPT | Performed by: INTERNAL MEDICINE

## 2024-09-25 PROCEDURE — 99214 OFFICE O/P EST MOD 30 MIN: CPT | Performed by: INTERNAL MEDICINE

## 2024-09-25 PROCEDURE — 3074F SYST BP LT 130 MM HG: CPT | Performed by: INTERNAL MEDICINE

## 2024-09-25 RX ORDER — ATORVASTATIN CALCIUM 40 MG/1
40 TABLET, FILM COATED ORAL NIGHTLY
Qty: 100 TABLET | Refills: 4 | Status: SHIPPED | OUTPATIENT
Start: 2024-09-25

## 2024-09-25 ASSESSMENT — ENCOUNTER SYMPTOMS
SENSORY CHANGE: 0
CHILLS: 0
ABDOMINAL PAIN: 0
EYE PAIN: 0
BRUISES/BLEEDS EASILY: 0
FALLS: 0
PND: 0
FEVER: 0
PALPITATIONS: 1
WEIGHT LOSS: 0
DEPRESSION: 0
BLOOD IN STOOL: 0
DIZZINESS: 0
VOMITING: 0
HEADACHES: 0
SPEECH CHANGE: 0
CLAUDICATION: 0
EYE DISCHARGE: 0
DOUBLE VISION: 0
LOSS OF CONSCIOUSNESS: 0
ORTHOPNEA: 0
MYALGIAS: 0
NAUSEA: 0
HALLUCINATIONS: 0
SHORTNESS OF BREATH: 0
BLURRED VISION: 0
COUGH: 0

## 2024-09-25 ASSESSMENT — FIBROSIS 4 INDEX: FIB4 SCORE: 4.37

## 2024-09-25 NOTE — PROGRESS NOTES
Chief Complaint   Patient presents with    Chest Pain     F/V DX:Other chest pain         Subjective     Yunior Milan Ambriz is a 64 y.o. male who presents today for cardiac care and evaluation of palpitations. Palpitations are sporadic. No specific worsening symptoms or precipitating symptoms. Patient feels like heart is being pulled down. No family history of sudden cardiac death. No presyncope or syncope associated with patient's palpitations.    Patient does have prior history of atrial fibrillation, status post ablation procedure in November 2023.    In June 2024, patient saw me in the clinic and found to have narrow complex tachycardia.  Patient was sent to the ER and eventually cardioverted.  EKG at the time eventually showed underlying coarse atrial fibrillation versus atrial flutter.    I have personally interpreted EKG today with patient, there is no evidence of acute coronary syndrome, no evidence of prior infarct, normal NC and QT interval, no significant conduction disease. Sinus rhythm.    I have independently interpreted and reviewed echocardiogram's actual images with patient which showed normal left ventricular systolic function. No wall motion abnormality. No evidence of pulmonary hypertension. No significant valvular disease.  Moderate left ventricular hypertrophy.    I have independently interpreted and reviewed blood tests results with patient in clinic which shows LDL level of 120, triglycerides level of 147, GFR of 98, K of 4.5.      No past medical history on file.  No past surgical history on file.  No family history on file.  Social History     Socioeconomic History    Marital status:      Spouse name: Not on file    Number of children: Not on file    Years of education: Not on file    Highest education level: Not on file   Occupational History    Not on file   Tobacco Use    Smoking status: Never    Smokeless tobacco: Never   Vaping Use    Vaping status: Never Used   Substance and  Sexual Activity    Alcohol use: Yes     Alcohol/week: 8.4 oz     Types: 14 Glasses of wine per week    Drug use: Never    Sexual activity: Not on file   Other Topics Concern    Not on file   Social History Narrative    Not on file     Social Determinants of Health     Financial Resource Strain: Not on file   Food Insecurity: Not on file   Transportation Needs: Not on file   Physical Activity: Not on file   Stress: Not on file   Social Connections: Not on file   Intimate Partner Violence: Not on file   Housing Stability: Not on file     No Known Allergies  Outpatient Encounter Medications as of 9/25/2024   Medication Sig Dispense Refill    atorvastatin (LIPITOR) 40 MG Tab Take 1 Tablet by mouth every evening. 100 Tablet 4    Tirzepatide-Weight Management (ZEPBOUND) 2.5 MG/0.5ML Solution Auto-injector Inject 2.5 mg under the skin every 7 days for 28 days. 2 mL 1    ELIQUIS 5 MG Tab Take 5 mg by mouth 2 times a day.      azelastine (ASTELIN) 137 MCG/SPRAY nasal spray Administer 2 Sprays into affected nostril(S) 2 times a day.      fluticasone (FLONASE) 50 MCG/ACT nasal spray Administer 1 Spray into affected nostril(S) 2 times a day.      metoprolol SR (TOPROL XL) 100 MG TABLET SR 24 HR Take 100 mg by mouth 2 times a day.      amiodarone (CORDARONE) 200 MG Tab Take 100 mg by mouth every Monday, Wednesday, and Friday. .5 tab = 100 mg  Indications: Atrial Fibrillation      cyclobenzaprine (FLEXERIL) 5 mg tablet Take 1 Tablet by mouth 2 times a day as needed for Moderate Pain for up to 30 days. 60 Tablet 1     No facility-administered encounter medications on file as of 9/25/2024.     Review of Systems   Constitutional:  Negative for chills, fever, malaise/fatigue and weight loss.   HENT:  Negative for ear discharge, ear pain, hearing loss and nosebleeds.    Eyes:  Negative for blurred vision, double vision, pain and discharge.   Respiratory:  Negative for cough and shortness of breath.    Cardiovascular:  Positive for  "palpitations. Negative for chest pain, orthopnea, claudication, leg swelling and PND.   Gastrointestinal:  Negative for abdominal pain, blood in stool, melena, nausea and vomiting.   Genitourinary:  Negative for dysuria and hematuria.   Musculoskeletal:  Negative for falls, joint pain and myalgias.   Skin:  Negative for itching and rash.   Neurological:  Negative for dizziness, sensory change, speech change, loss of consciousness and headaches.   Endo/Heme/Allergies:  Negative for environmental allergies. Does not bruise/bleed easily.   Psychiatric/Behavioral:  Negative for depression, hallucinations and suicidal ideas.               Objective     /64 (BP Location: Left arm, Patient Position: Sitting, BP Cuff Size: Adult)   Pulse 63   Resp 16   Ht 1.905 m (6' 3\")   Wt 121 kg (267 lb 3.2 oz)   SpO2 97%   BMI 33.40 kg/m²     Physical Exam  Vitals and nursing note reviewed.   Constitutional:       General: He is not in acute distress.     Appearance: He is not diaphoretic.   HENT:      Head: Normocephalic and atraumatic.      Right Ear: External ear normal.      Left Ear: External ear normal.      Nose: No congestion or rhinorrhea.   Eyes:      General:         Right eye: No discharge.         Left eye: No discharge.   Neck:      Thyroid: No thyromegaly.      Vascular: No JVD.   Cardiovascular:      Rate and Rhythm: Normal rate and regular rhythm.      Pulses: Normal pulses.   Pulmonary:      Effort: No respiratory distress.   Abdominal:      General: There is no distension.      Tenderness: There is no abdominal tenderness.   Musculoskeletal:         General: No swelling or tenderness.      Right lower leg: No edema.      Left lower leg: No edema.   Skin:     General: Skin is warm and dry.   Neurological:      Mental Status: He is alert and oriented to person, place, and time.      Cranial Nerves: No cranial nerve deficit.   Psychiatric:         Behavior: Behavior normal.                Assessment & Plan "     1. Paroxysmal atrial fibrillation (HCC)  EKG    FREE K&L LT CHAINS, QT, SERUM    NM-CARDIAC AMYLOIDOSIS PYP SCAN    NM-CARDIAC STRESS TEST    Polysomnography, 1-3    Referral to Pulmonary and Sleep Medicine      2. Left ventricular hypertrophy  FREE K&L LT CHAINS, QT, SERUM    NM-CARDIAC AMYLOIDOSIS PYP SCAN    NM-CARDIAC STRESS TEST      3. Hypercoagulable state due to paroxysmal atrial fibrillation (HCC)        4. Pure hypercholesterolemia  atorvastatin (LIPITOR) 40 MG Tab    Basic Metabolic Panel    LIPID PANEL      5. HTN (hypertension), malignant            Medical Decision Making: Today's Assessment/Status/Plan:     At this time, coupled with moderate LVH, atrial arrhythmias, I will screen patient for potential infiltrative cardiomyopathy to rule out cardiac amyloidosis.    Patient is in sinus rhythm today.  Continue amiodarone 100 mg every other day.    Continue anticoagulation Eliquis 5 mg BID for stroke risk reduction in setting of PAF.    Will start Atorvastatin 40 mg daily for LDL control.    Continue Zepbound for weight loss as PMD.    I will refer patient to have sleep studies for obstructive sleep apnea for recurrent atrial arrhythmias.    This visit encounter signifies the visit complexity inherent to evaluation and management associated with medical care services that serve as the continuing focal point for all needed health care services and/or with medical care services that are part of ongoing care related to this patient's single, serious condition, complex cardiac condition.    Arnulfo Olguin M.D.

## 2024-12-06 ENCOUNTER — OFFICE VISIT (OUTPATIENT)
Dept: MEDICAL GROUP | Facility: MEDICAL CENTER | Age: 64
End: 2024-12-06
Payer: MEDICARE

## 2024-12-06 ENCOUNTER — OFFICE VISIT (OUTPATIENT)
Dept: PHYSICAL MEDICINE AND REHAB | Facility: MEDICAL CENTER | Age: 64
End: 2024-12-06
Payer: MEDICARE

## 2024-12-06 VITALS
DIASTOLIC BLOOD PRESSURE: 52 MMHG | HEIGHT: 75 IN | SYSTOLIC BLOOD PRESSURE: 110 MMHG | TEMPERATURE: 96.5 F | BODY MASS INDEX: 32.18 KG/M2 | WEIGHT: 258.8 LBS | HEART RATE: 58 BPM | OXYGEN SATURATION: 95 %

## 2024-12-06 VITALS
BODY MASS INDEX: 32.18 KG/M2 | HEIGHT: 75 IN | OXYGEN SATURATION: 94 % | SYSTOLIC BLOOD PRESSURE: 120 MMHG | TEMPERATURE: 97.6 F | DIASTOLIC BLOOD PRESSURE: 66 MMHG | HEART RATE: 55 BPM | WEIGHT: 258.82 LBS

## 2024-12-06 DIAGNOSIS — R07.81 RIB PAIN ON RIGHT SIDE: ICD-10-CM

## 2024-12-06 DIAGNOSIS — M47.816 FACET ARTHRITIS OF LUMBAR REGION: ICD-10-CM

## 2024-12-06 DIAGNOSIS — E78.00 ELEVATED LDL CHOLESTEROL LEVEL: ICD-10-CM

## 2024-12-06 DIAGNOSIS — E66.9 OBESITY (BMI 30-39.9): ICD-10-CM

## 2024-12-06 DIAGNOSIS — E66.811 OBESITY (BMI 30.0-34.9): ICD-10-CM

## 2024-12-06 DIAGNOSIS — M47.9 SPONDYLOSIS: ICD-10-CM

## 2024-12-06 DIAGNOSIS — M99.08 SOMATIC DYSFUNCTION OF RIB: ICD-10-CM

## 2024-12-06 DIAGNOSIS — H43.393 VITREOUS FLOATERS OF BOTH EYES: ICD-10-CM

## 2024-12-06 DIAGNOSIS — Z71.82 EXERCISE COUNSELING: ICD-10-CM

## 2024-12-06 PROBLEM — I48.91 ATRIAL FIBRILLATION WITH RAPID VENTRICULAR RESPONSE (HCC): Status: RESOLVED | Noted: 2024-06-25 | Resolved: 2024-12-06

## 2024-12-06 PROCEDURE — 3078F DIAST BP <80 MM HG: CPT | Performed by: PHYSICIAN ASSISTANT

## 2024-12-06 PROCEDURE — 99214 OFFICE O/P EST MOD 30 MIN: CPT | Performed by: PHYSICIAN ASSISTANT

## 2024-12-06 PROCEDURE — 1125F AMNT PAIN NOTED PAIN PRSNT: CPT | Performed by: GENERAL PRACTICE

## 2024-12-06 PROCEDURE — 3078F DIAST BP <80 MM HG: CPT | Performed by: GENERAL PRACTICE

## 2024-12-06 PROCEDURE — 3074F SYST BP LT 130 MM HG: CPT | Performed by: PHYSICIAN ASSISTANT

## 2024-12-06 PROCEDURE — 1125F AMNT PAIN NOTED PAIN PRSNT: CPT | Performed by: PHYSICIAN ASSISTANT

## 2024-12-06 PROCEDURE — 3074F SYST BP LT 130 MM HG: CPT | Performed by: GENERAL PRACTICE

## 2024-12-06 PROCEDURE — 99213 OFFICE O/P EST LOW 20 MIN: CPT | Performed by: GENERAL PRACTICE

## 2024-12-06 ASSESSMENT — PATIENT HEALTH QUESTIONNAIRE - PHQ9: CLINICAL INTERPRETATION OF PHQ2 SCORE: 0

## 2024-12-06 ASSESSMENT — FIBROSIS 4 INDEX
FIB4 SCORE: 4.37
FIB4 SCORE: 4.37

## 2024-12-06 ASSESSMENT — PAIN SCALES - GENERAL: PAINLEVEL_OUTOF10: 1=MINIMAL PAIN

## 2024-12-06 NOTE — PROGRESS NOTES
Physiatry (Physical Medicine and  Rehabilitation)       Patient Name: Yunior Ambriz   Patient : 1960  PCP: Leoncio Grider P.A.-C.  MRN: [id number]     Date of service: See epic    Referring provider: Leoncio Grider P.A.-*    CHIEF COMPLAINT  Chief Complaint   Patient presents with    Follow-Up     3m fv          Yunior Ambriz is a 64 y.o. very pleasant male  who presents today with Diagnoses of Somatic dysfunction of rib, Rib pain on right side, Spondylosis, Facet arthritis of lumbar region, Exercise counseling, and Obesity (BMI 30-39.9) were pertinent to this visit.    Verbal consent was acquired by the patient to use [employer] ambient listening note generation during this visit Yes       Medical records review:  I reviewed the note from the referring provider Leoncio Grider P.A.-* including the note dated 2024.  24 Cardiology    Prior Relevant MSK/Interventional Procedures:     HPI:    History of Present Illness  The patient presents for evaluation of thoracic pain.    He reports a significant improvement in his condition, which he attributes to a conservative management plan consisting of stretching exercises and a short course of analgesics, which he found beneficial. He is currently engaged in physical activity, specifically the installation of a hardwood floor, which involves frequent repetitive movements. This has resulted in some myalgia in his triceps and lower extremities, which he perceives as a positive sign of physical exertion. He denies experiencing any dyspnea or cough.  Currently putting in hardwood justus  Accompanied by his wife.     Prior notes  Patient has tried the following medications:  Flexeril 10 mg effective.    Previous or Current Therapeutic modalities and interventional therapies:  -Topicals:?yes, inconsistently      ROS:   Fever, Chills, Sweats: Denies  Involuntary Weight Loss: Denies  Bowel/bladder issues: denies   See HPI.   All other systems  reviewed and negative.     OCCUPATIONAL history: Former business owner  HOBBIES/ACTIVITIES: former hang glider  & motorocycle & surfer.     GOALS OF TREATMENT: Symptom/Pain relief. improve function.      Psychological testing for pain as depression and pain commonly coexist and need to both be treated.     Opioid Risk Score: No value filed.      Interpretation of Opioid Risk Score   Score 0-3 = Low risk of abuse. Do UDS at least once per year.  Score 4-7 = Moderate risk of abuse. Do UDS 1-4 times per year.  Score 8+ = High risk of abuse. Refer to specialist.    PHQ      6/5/2024     1:40 PM 6/25/2024     7:12 PM 12/6/2024    11:00 AM   Depression Screen (PHQ-2/PHQ-9)   PHQ-2 Total Score  0    PHQ-2 Total Score 0  0       Interpretation of PHQ-9 Total Score   Score Severity   1-4 No Depression   5-9 Mild Depression   10-14 Moderate Depression   15-19 Moderately Severe Depression   20-27 Severe Depression      PMHx:   History reviewed. No pertinent past medical history.    PSHx:   No past surgical history on file.    Family history   No family history on file.    Medications: reviewed on epic.   Outpatient Medications Marked as Taking for the 12/6/24 encounter (Office Visit) with Michael Davidson D.O.   Medication Sig Dispense Refill    atorvastatin (LIPITOR) 40 MG Tab Take 1 Tablet by mouth every evening. 100 Tablet 4    ELIQUIS 5 MG Tab Take 5 mg by mouth 2 times a day.      azelastine (ASTELIN) 137 MCG/SPRAY nasal spray Administer 2 Sprays into affected nostril(S) 2 times a day.      fluticasone (FLONASE) 50 MCG/ACT nasal spray Administer 1 Spray into affected nostril(S) 2 times a day.      metoprolol SR (TOPROL XL) 100 MG TABLET SR 24 HR Take 100 mg by mouth 2 times a day.      amiodarone (CORDARONE) 200 MG Tab Take 100 mg by mouth every Monday, Wednesday, and Friday. .5 tab = 100 mg  Indications: Atrial Fibrillation          Allergies:   No Known Allergies    Social Hx:   Social History     Socioeconomic History     "Marital status:      Spouse name: Not on file    Number of children: Not on file    Years of education: Not on file    Highest education level: Not on file   Occupational History    Not on file   Tobacco Use    Smoking status: Never    Smokeless tobacco: Never   Vaping Use    Vaping status: Never Used   Substance and Sexual Activity    Alcohol use: Yes     Alcohol/week: 8.4 oz     Types: 14 Glasses of wine per week    Drug use: Never    Sexual activity: Not on file   Other Topics Concern    Not on file   Social History Narrative    Not on file     Social Drivers of Health     Financial Resource Strain: Not on file   Food Insecurity: Not on file   Transportation Needs: Not on file   Physical Activity: Not on file   Stress: Not on file   Social Connections: Not on file   Intimate Partner Violence: Not on file   Housing Stability: Not on file         EXAMINATION   Vitals: /66 (BP Location: Right arm, Patient Position: Sitting, BP Cuff Size: Adult)   Pulse (!) 55   Temp 36.4 °C (97.6 °F) (Temporal)   Ht 1.905 m (6' 3\")   Wt 117 kg (258 lb 13.1 oz)   SpO2 94%   Physical Exam:     Body Habitus: Body mass index is 32.35 kg/m².  Appearance: Well-groomed, well-nourished, not disheveled  Eyes: No scleral icterus to suggest severe liver disease, no proptosis to suggest severe hyperthyroid  ENT -no obvious auditory deficits, no external lesions, moist mucus membranes   Skin -no rashes or lesions noted. No appreciable skin breakdown on exposed skin areas.    Respiratory-  breathing comfortably on room air, no audible wheezing, full sentences  Cardiovascular- No lower extremity edema noted.   Psychiatric- alert and oriented, calm, comfortable, cooperative     Musculoskeletal and Neuro:  Gait and station - normal gait with reciprocal pattern,  no presence/use of ambulatory device, no arm assistance with sit-to-stand, nonantalgic. no loss of balance during exam.  No change in patient's demeanor with exam.  "   Grossly normal cranial nerve exam  Coordination grossly intact  Moving upper extremities against gravity     Physical Exam  Lungs show normal breathing.  Right shoulder demonstrates normal range of motion. Patient is able to take deep breaths without pain. No tenderness upon palpation of the intercostal spaces.  Moving all extremities against gravity        MEDICAL DECISION MAKING    Medical records review: see under HPI section.     DATA    Labs:   Lab Results   Component Value Date/Time    SODIUM 138 08/26/2024 09:06 AM    POTASSIUM 4.2 08/26/2024 09:06 AM    CHLORIDE 104 08/26/2024 09:06 AM    CO2 23 08/26/2024 09:06 AM    ANION 11.0 08/26/2024 09:06 AM    GLUCOSE 116 (H) 08/26/2024 09:06 AM    BUN 8 08/26/2024 09:06 AM    CREATININE 0.80 08/26/2024 09:06 AM    CALCIUM 9.3 08/26/2024 09:06 AM    ASTSGOT 52 (H) 08/26/2024 09:06 AM    ALTSGPT 43 08/26/2024 09:06 AM    TBILIRUBIN 2.1 (H) 08/26/2024 09:06 AM    ALBUMIN 4.2 08/26/2024 09:06 AM    TOTPROTEIN 7.1 08/26/2024 09:06 AM    GLOBULIN 2.9 08/26/2024 09:06 AM    AGRATIO 1.4 08/26/2024 09:06 AM   ]    Lab Results   Component Value Date/Time    PROTHROMBTM 16.6 (H) 06/25/2024 03:10 PM    INR 1.33 (H) 06/25/2024 03:10 PM        Lab Results   Component Value Date/Time    WBC 7.9 08/26/2024 09:06 AM    RBC 4.97 08/26/2024 09:06 AM    HEMOGLOBIN 16.4 08/26/2024 09:06 AM    HEMATOCRIT 47.3 08/26/2024 09:06 AM    MCV 95.2 08/26/2024 09:06 AM    MCH 33.0 08/26/2024 09:06 AM    MCHC 34.7 08/26/2024 09:06 AM    MPV 10.2 08/26/2024 09:06 AM    NEUTSPOLYS 74.00 (H) 08/26/2024 09:06 AM    LYMPHOCYTES 12.50 (L) 08/26/2024 09:06 AM    MONOCYTES 12.40 08/26/2024 09:06 AM    EOSINOPHILS 0.30 08/26/2024 09:06 AM    BASOPHILS 0.50 08/26/2024 09:06 AM        Lab Results   Component Value Date/Time    HBA1C 5.2 06/24/2024 03:43 PM        Imaging:   I personally reviewed following images, these are my reads  Ribs 6/5/2024: No obvious acute osseous abnormality  Portable chest  2024: No acute obvious osseous abnormality    CT L-spine 2024: Degenerative changes with flowing osteophyte    IMAGING radiology reads. I reviewed the following radiology reads                                                                                       Results for orders placed in visit on 24    [id number] (WITH 1-VIEW CXR) RIGHT    Impression  No displaced rib fracture.                       ASSESSMENT AND PLAN:  Yunior Ambriz   : 1960   Past medical history of BMI 32, type II NSTEMI, seasonal allergies, elevated fasting glucose level, chronic atrial fibrillation, chronic anticoagulation    Diagnoses and all orders for this visit:  1. Somatic dysfunction of rib        2. Rib pain on right side        3. Spondylosis        4. Facet arthritis of lumbar region        5. Exercise counseling        6. Obesity (BMI 30-39.9)            Assessment & Plan  Rib somatic dysfunction.  The patient's symptoms and physical examination findings are consistent with rib somatic dysfunction. A conservative approach, including stretches and pain medication, was recommended and has proven effective. He reported significant improvement and is now pain-free. He was advised to monitor his condition closely. Should he experience any neck, back, or knee pain, or any issues with his extremities, he should return for further evaluation. Depending on the findings, he may be referred to a sports medicine specialist.         No orders of the defined types were placed in this encounter.      -Medications/Modalities:   You may also try ice packs or heating pads to help with pain for no more than 15 minutes at a time several times a day and monitor the skin for changes, Use of topical agents (diclofenac, Lidoderm, lidocaine), and Short trial of Acetaminophen as needed (patient denies liver disease)  -Therapy (PT/OT/Aquatherapy): HEP  -Diagnostic workup: reviewed today as above;    -Interventional  program: not indicated at this time   -Referrals: none required at this time    Follow-up:   Return to clinic as needed  Patient expressed understanding of the management plan. Patient (and Family Members) was/were encouraged to call if any worries, issues, problems or concerns prior to the next visit     Please note that this dictation was created using voice recognition software. I have made every reasonable attempt to correct obvious errors but there may be errors of grammar and content that I may have overlooked prior to finalization of this note.      Michael Davidson, DO  Physical Medicine and Rehabilitation  [facility]         NORIS Torres-C.   CC Leoncio Grider P.A.-*

## 2024-12-06 NOTE — PROGRESS NOTES
Subjective:     History of Present Illness  The patient presents for evaluation of multiple medical concerns. He is accompanied by his wife.    He reports an improvement in his overall health since starting the weight loss medication prescribed during his last visit. He has been actively involved in home remodeling, including installing hardwood floors, which has increased his physical activity. Additionally, he has been attending the gym more frequently and plans to join a new one once his insurance coverage is confirmed.    He is currently taking amiodarone and Eliquis, but not atorvastatin. He has never taken a statin before. He has not yet undergone the stress test or blood work as he was waiting for his appointment with Dr. Olguin. He is also on Zepbound, which he initially started at 20 units and has now increased to 50 units.  Spends $400 plus a month.    He is requesting a referral to an ophthalmologist due to a family history of macular degeneration. He used to see an ophthalmologist in [facility] every few years but has not done so recently. He uses reading glasses with a strength of 1.5 and has noticed floaters in both eyes.    He was experiencing pain on his right side and thought he had liver cancer, but it was determined that he had a rib out of place. It was very painful, but it has been pretty much rectified. The doctor gave him some movements to do. He went to the emergency room because it had been bothering him for 3 months.  He has an appointment today with Dr. Davidson.        Current medicines (including changes today)  Current Outpatient Medications   Medication Sig Dispense Refill    atorvastatin (LIPITOR) 40 MG Tab Take 1 Tablet by mouth every evening. 100 Tablet 4    ELIQUIS 5 MG Tab Take 5 mg by mouth 2 times a day.      azelastine (ASTELIN) 137 MCG/SPRAY nasal spray Administer 2 Sprays into affected nostril(S) 2 times a day.      fluticasone (FLONASE) 50 MCG/ACT nasal spray Administer 1  "Spray into affected nostril(S) 2 times a day.      metoprolol SR (TOPROL XL) 100 MG TABLET SR 24 HR Take 100 mg by mouth 2 times a day.      amiodarone (CORDARONE) 200 MG Tab Take 100 mg by mouth every Monday, Wednesday, and Friday. .5 tab = 100 mg  Indications: Atrial Fibrillation       No current facility-administered medications for this visit.     He  has no past medical history on file.    ROS   No chest pain, no shortness of breath, no abdominal pain  Positive ROS as per HPI.  All other systems reviewed and are negative.     Objective:     /52 (BP Location: Left arm, Patient Position: Sitting, BP Cuff Size: Adult long)   Pulse (!) 58   Temp 35.8 °C (96.5 °F) (Temporal)   Ht 1.905 m (6' 3\")   Wt 117 kg (258 lb 12.8 oz)   SpO2 95%  Body mass index is 32.35 kg/m².   Physical Exam    Constitutional: Alert, no distress.  Skin: Warm, dry, good turgor, no rashes in visible areas.  Eye: Equal, round and reactive, conjunctiva clear, lids normal.  ENMT: Lips without lesions, good dentition, oropharynx clear.  Neck: Trachea midline, no masses, no thyromegaly.   Psych: Alert and oriented x3, normal affect and mood.      Results          Assessment and Plan:   The following treatment plan was discussed    Assessment & Plan  1. Hypercholesterolemia.  Chronic condition.  Dr. Olguin has initiated atorvastatin therapy for him. Given his cardiac history, it is prudent to conduct a CT cardiac scoring to check for plaque. The potential side effects of statins, including weakness and body aches, were discussed.  He will follow-up with the stress test.  He will inform the me of the current tirzepatide dose so I may change that to the Zepbound dosage in milligrams, and a prescription will be sent to [facility].  He has lost 10 pounds recently.    2. Eye floaters.  A referral will be made to [facility], specifically to optometrist Luis Tapia, to evaluate his eye floaters.          ORDERS:  1. Rib " pain on right side      2. Obesity (BMI 30.0-34.9)      3. Elevated LDL cholesterol level    - CT-CARDIAC SCORING; Future    4. Vitreous floaters of both eyes    - Referral to Ophthalmology        Please note that this dictation was created using voice recognition software. I have made every reasonable attempt to correct obvious errors, but I expect that there are errors of grammar and possibly content that I did not discover before finalizing the note.      Attestation      Verbal consent was acquired by the patient to use [employer] ambient listening note generation during this visit Yes

## 2024-12-09 DIAGNOSIS — E66.811 OBESITY (BMI 30.0-34.9): ICD-10-CM

## 2024-12-10 ENCOUNTER — HOSPITAL ENCOUNTER (OUTPATIENT)
Dept: RADIOLOGY | Facility: MEDICAL CENTER | Age: 64
End: 2024-12-10
Attending: PHYSICIAN ASSISTANT
Payer: COMMERCIAL

## 2024-12-10 DIAGNOSIS — E78.00 ELEVATED LDL CHOLESTEROL LEVEL: ICD-10-CM

## 2024-12-10 PROCEDURE — 4410556 CT-CARDIAC SCORING (SELF PAY ONLY)

## 2024-12-12 PROBLEM — R93.1 AGATSTON CORONARY ARTERY CALCIUM SCORE BETWEEN 200 AND 399: Status: ACTIVE | Noted: 2024-12-12

## 2025-01-07 ENCOUNTER — APPOINTMENT (OUTPATIENT)
Dept: URBAN - METROPOLITAN AREA CLINIC 4 | Facility: CLINIC | Age: 65
Setting detail: DERMATOLOGY
End: 2025-01-07

## 2025-01-07 DIAGNOSIS — D485 NEOPLASM OF UNCERTAIN BEHAVIOR OF SKIN: ICD-10-CM

## 2025-01-07 DIAGNOSIS — L81.4 OTHER MELANIN HYPERPIGMENTATION: ICD-10-CM

## 2025-01-07 DIAGNOSIS — D22 MELANOCYTIC NEVI: ICD-10-CM

## 2025-01-07 DIAGNOSIS — D18.0 HEMANGIOMA: ICD-10-CM

## 2025-01-07 DIAGNOSIS — Z87.2 PERSONAL HISTORY OF DISEASES OF THE SKIN AND SUBCUTANEOUS TISSUE: ICD-10-CM

## 2025-01-07 DIAGNOSIS — Z85.828 PERSONAL HISTORY OF OTHER MALIGNANT NEOPLASM OF SKIN: ICD-10-CM

## 2025-01-07 DIAGNOSIS — L57.0 ACTINIC KERATOSIS: ICD-10-CM

## 2025-01-07 DIAGNOSIS — Z71.89 OTHER SPECIFIED COUNSELING: ICD-10-CM

## 2025-01-07 DIAGNOSIS — L82.1 OTHER SEBORRHEIC KERATOSIS: ICD-10-CM

## 2025-01-07 PROBLEM — D18.01 HEMANGIOMA OF SKIN AND SUBCUTANEOUS TISSUE: Status: ACTIVE | Noted: 2025-01-07

## 2025-01-07 PROBLEM — D22.5 MELANOCYTIC NEVI OF TRUNK: Status: ACTIVE | Noted: 2025-01-07

## 2025-01-07 PROBLEM — D48.5 NEOPLASM OF UNCERTAIN BEHAVIOR OF SKIN: Status: ACTIVE | Noted: 2025-01-07

## 2025-01-07 PROCEDURE — 99213 OFFICE O/P EST LOW 20 MIN: CPT | Mod: 25

## 2025-01-07 PROCEDURE — ? SUNSCREEN RECOMMENDATIONS

## 2025-01-07 PROCEDURE — ? BIOPSY BY SHAVE METHOD

## 2025-01-07 PROCEDURE — 17000 DESTRUCT PREMALG LESION: CPT | Mod: 59

## 2025-01-07 PROCEDURE — ? ADDITIONAL NOTES

## 2025-01-07 PROCEDURE — ? COUNSELING

## 2025-01-07 PROCEDURE — 11102 TANGNTL BX SKIN SINGLE LES: CPT

## 2025-01-07 PROCEDURE — ? DIAGNOSIS COMMENT

## 2025-01-07 PROCEDURE — ? LIQUID NITROGEN

## 2025-01-07 ASSESSMENT — LOCATION SIMPLE DESCRIPTION DERM
LOCATION SIMPLE: LEFT CHEEK
LOCATION SIMPLE: LEFT SHOULDER
LOCATION SIMPLE: UPPER BACK
LOCATION SIMPLE: ABDOMEN
LOCATION SIMPLE: RIGHT UPPER BACK
LOCATION SIMPLE: RIGHT KNEE
LOCATION SIMPLE: RIGHT THIGH
LOCATION SIMPLE: RIGHT SHOULDER
LOCATION SIMPLE: LEFT UPPER BACK
LOCATION SIMPLE: CHEST
LOCATION SIMPLE: RIGHT LOWER BACK
LOCATION SIMPLE: LEFT KNEE

## 2025-01-07 ASSESSMENT — LOCATION DETAILED DESCRIPTION DERM
LOCATION DETAILED: RIGHT SUPERIOR UPPER BACK
LOCATION DETAILED: LEFT KNEE
LOCATION DETAILED: LEFT MEDIAL SUPERIOR CHEST
LOCATION DETAILED: INFERIOR THORACIC SPINE
LOCATION DETAILED: LEFT SUPERIOR UPPER BACK
LOCATION DETAILED: RIGHT POSTERIOR SHOULDER
LOCATION DETAILED: RIGHT MEDIAL SUPERIOR CHEST
LOCATION DETAILED: LEFT MEDIAL MALAR CHEEK
LOCATION DETAILED: RIGHT SUPERIOR MEDIAL LOWER BACK
LOCATION DETAILED: RIGHT ANTERIOR PROXIMAL THIGH
LOCATION DETAILED: EPIGASTRIC SKIN
LOCATION DETAILED: LEFT POSTERIOR SHOULDER
LOCATION DETAILED: RIGHT KNEE
LOCATION DETAILED: LEFT INFERIOR MEDIAL BUCCAL CHEEK

## 2025-01-07 ASSESSMENT — LOCATION ZONE DERM
LOCATION ZONE: ARM
LOCATION ZONE: FACE
LOCATION ZONE: TRUNK
LOCATION ZONE: LEG

## 2025-01-07 NOTE — PROCEDURE: LIQUID NITROGEN
Show Applicator Variable?: Yes
Application Tool (Optional): Liquid Nitrogen Sprayer
Detail Level: Detailed
Post-Care Instructions: I reviewed with the patient in detail post-care instructions. Patient is to wear sunprotection, and avoid picking at any of the treated lesions. Pt may apply Vaseline to crusted or scabbing areas.
Number Of Freeze-Thaw Cycles: 1 freeze-thaw cycle
Render Post-Care Instructions In Note?: no
Duration Of Freeze Thaw-Cycle (Seconds): 4
Consent: The patient's consent was obtained including but not limited to risks of crusting, scabbing, blistering, scarring, darker or lighter pigmentary change, recurrence, incomplete removal and infection.

## 2025-01-07 NOTE — PROCEDURE: ADDITIONAL NOTES
Detail Level: Simple
Render Risk Assessment In Note?: no
Additional Notes: Recommended CeraVe SA Cream
Additional Notes: Treated with ln2

## 2025-01-10 ENCOUNTER — PATIENT MESSAGE (OUTPATIENT)
Dept: HEALTH INFORMATION MANAGEMENT | Facility: OTHER | Age: 65
End: 2025-01-10

## 2025-01-13 DIAGNOSIS — E66.811 OBESITY (BMI 30.0-34.9): ICD-10-CM

## 2025-01-15 ENCOUNTER — HOSPITAL ENCOUNTER (OUTPATIENT)
Dept: RADIOLOGY | Facility: MEDICAL CENTER | Age: 65
End: 2025-01-15
Attending: INTERNAL MEDICINE
Payer: MEDICARE

## 2025-01-15 DIAGNOSIS — I51.7 LEFT VENTRICULAR HYPERTROPHY: ICD-10-CM

## 2025-01-15 DIAGNOSIS — I48.0 PAROXYSMAL ATRIAL FIBRILLATION (HCC): ICD-10-CM

## 2025-01-15 PROCEDURE — 700111 HCHG RX REV CODE 636 W/ 250 OVERRIDE (IP)

## 2025-01-15 PROCEDURE — A9502 TC99M TETROFOSMIN: HCPCS

## 2025-01-15 PROCEDURE — 93018 CV STRESS TEST I&R ONLY: CPT | Performed by: INTERNAL MEDICINE

## 2025-01-15 PROCEDURE — 78452 HT MUSCLE IMAGE SPECT MULT: CPT | Mod: 26 | Performed by: INTERNAL MEDICINE

## 2025-01-15 RX ORDER — REGADENOSON 0.08 MG/ML
0.4 INJECTION, SOLUTION INTRAVENOUS ONCE
Status: COMPLETED | OUTPATIENT
Start: 2025-01-15 | End: 2025-01-15

## 2025-01-15 RX ORDER — AMINOPHYLLINE 25 MG/ML
100 INJECTION, SOLUTION INTRAVENOUS
Status: DISCONTINUED | OUTPATIENT
Start: 2025-01-15 | End: 2025-01-16 | Stop reason: HOSPADM

## 2025-01-15 RX ORDER — REGADENOSON 0.08 MG/ML
INJECTION, SOLUTION INTRAVENOUS
Status: COMPLETED
Start: 2025-01-15 | End: 2025-01-15

## 2025-01-15 RX ADMIN — REGADENOSON 0.4 MG: 0.08 INJECTION, SOLUTION INTRAVENOUS at 14:56

## 2025-01-29 ENCOUNTER — HOSPITAL ENCOUNTER (OUTPATIENT)
Dept: RADIOLOGY | Facility: MEDICAL CENTER | Age: 65
End: 2025-01-29
Attending: INTERNAL MEDICINE
Payer: MEDICARE

## 2025-01-29 DIAGNOSIS — I48.0 PAROXYSMAL ATRIAL FIBRILLATION (HCC): ICD-10-CM

## 2025-01-29 DIAGNOSIS — I51.7 LEFT VENTRICULAR HYPERTROPHY: ICD-10-CM

## 2025-01-29 PROCEDURE — A9538 TC99M PYROPHOSPHATE: HCPCS

## 2025-02-11 ENCOUNTER — TELEPHONE (OUTPATIENT)
Dept: HEALTH INFORMATION MANAGEMENT | Facility: OTHER | Age: 65
End: 2025-02-11
Payer: MEDICARE

## 2025-02-26 ENCOUNTER — APPOINTMENT (OUTPATIENT)
Dept: URBAN - METROPOLITAN AREA CLINIC 4 | Facility: CLINIC | Age: 65
Setting detail: DERMATOLOGY
End: 2025-02-26

## 2025-02-26 DIAGNOSIS — L82.1 OTHER SEBORRHEIC KERATOSIS: ICD-10-CM

## 2025-02-26 PROBLEM — C44.519 BASAL CELL CARCINOMA OF SKIN OF OTHER PART OF TRUNK: Status: ACTIVE | Noted: 2025-02-26

## 2025-02-26 PROCEDURE — ? DIAGNOSIS COMMENT

## 2025-02-26 PROCEDURE — ? ADDITIONAL NOTES

## 2025-02-26 PROCEDURE — 99212 OFFICE O/P EST SF 10 MIN: CPT

## 2025-02-26 PROCEDURE — ? COUNSELING

## 2025-02-26 ASSESSMENT — LOCATION DETAILED DESCRIPTION DERM: LOCATION DETAILED: RIGHT DISTAL DORSAL FOREARM

## 2025-02-26 ASSESSMENT — LOCATION ZONE DERM: LOCATION ZONE: ARM

## 2025-02-26 ASSESSMENT — LOCATION SIMPLE DESCRIPTION DERM: LOCATION SIMPLE: RIGHT FOREARM

## 2025-02-26 NOTE — PROCEDURE: DIAGNOSIS COMMENT
Comment: No residual BCC seen from Bx - will continue to monitor. Margins on biopsy were clear.
Render Risk Assessment In Note?: no
Detail Level: Detailed

## 2025-03-04 ENCOUNTER — TELEPHONE (OUTPATIENT)
Dept: FAMILY PLANNING/WOMEN'S HEALTH CLINIC | Facility: PHYSICIAN GROUP | Age: 65
End: 2025-03-04
Payer: MEDICARE

## 2025-03-05 NOTE — TELEPHONE ENCOUNTER
Called Yunior Ambriz and left message for patient to schedule annual [facility] Visit with [facility]. Left phone number for patient to call   to schedule.

## 2025-04-25 ENCOUNTER — TELEPHONE (OUTPATIENT)
Dept: CARDIOLOGY | Facility: MEDICAL CENTER | Age: 65
End: 2025-04-25
Payer: MEDICARE

## 2025-04-25 NOTE — TELEPHONE ENCOUNTER
Last OV 9/25/24. Current labs with future orders. Needs FV. Reminders sent via [employer].     To Scheduling.  Kindly contact patient to schedule next annual visit. Thank you very much!

## 2025-05-30 ENCOUNTER — PATIENT MESSAGE (OUTPATIENT)
Dept: CARDIOLOGY | Facility: MEDICAL CENTER | Age: 65
End: 2025-05-30
Payer: COMMERCIAL

## 2025-06-04 ENCOUNTER — PATIENT MESSAGE (OUTPATIENT)
Dept: CARDIOLOGY | Facility: MEDICAL CENTER | Age: 65
End: 2025-06-04
Payer: COMMERCIAL

## 2025-06-04 DIAGNOSIS — I10 HTN (HYPERTENSION), MALIGNANT: ICD-10-CM

## 2025-06-04 DIAGNOSIS — I47.10 SVT (SUPRAVENTRICULAR TACHYCARDIA) (HCC): ICD-10-CM

## 2025-06-04 DIAGNOSIS — I48.0 PAROXYSMAL ATRIAL FIBRILLATION (HCC): Primary | ICD-10-CM

## 2025-06-04 DIAGNOSIS — I51.7 LEFT VENTRICULAR HYPERTROPHY: ICD-10-CM

## 2025-06-05 ENCOUNTER — PATIENT MESSAGE (OUTPATIENT)
Dept: CARDIOLOGY | Facility: MEDICAL CENTER | Age: 65
End: 2025-06-05
Payer: COMMERCIAL

## 2025-06-05 RX ORDER — METOPROLOL SUCCINATE 100 MG/1
100 TABLET, EXTENDED RELEASE ORAL 2 TIMES DAILY
Qty: 180 TABLET | Refills: 0 | Status: SHIPPED | OUTPATIENT
Start: 2025-06-05

## 2025-06-05 NOTE — PATIENT COMMUNICATION
Arnulfo Olguin M.D. to Anupama Barton R.N.  Me  (Selected Message)  6/5/25 10:32 AM  Yes      Noted RX was renewed today 06/05/25.

## 2025-06-10 ENCOUNTER — HOSPITAL ENCOUNTER (OUTPATIENT)
Dept: LAB | Facility: MEDICAL CENTER | Age: 65
End: 2025-06-10
Attending: INTERNAL MEDICINE
Payer: MEDICARE

## 2025-06-10 ENCOUNTER — OFFICE VISIT (OUTPATIENT)
Dept: MEDICAL GROUP | Facility: MEDICAL CENTER | Age: 65
End: 2025-06-10
Payer: MEDICARE

## 2025-06-10 VITALS
OXYGEN SATURATION: 94 % | HEIGHT: 74 IN | DIASTOLIC BLOOD PRESSURE: 50 MMHG | SYSTOLIC BLOOD PRESSURE: 102 MMHG | HEART RATE: 64 BPM | TEMPERATURE: 97.8 F | BODY MASS INDEX: 31.84 KG/M2 | WEIGHT: 248.1 LBS

## 2025-06-10 DIAGNOSIS — E78.00 PURE HYPERCHOLESTEROLEMIA: ICD-10-CM

## 2025-06-10 DIAGNOSIS — I48.0 PAROXYSMAL ATRIAL FIBRILLATION (HCC): ICD-10-CM

## 2025-06-10 DIAGNOSIS — Z12.5 ENCOUNTER FOR SCREENING PROSTATE SPECIFIC ANTIGEN (PSA) MEASUREMENT: ICD-10-CM

## 2025-06-10 DIAGNOSIS — D69.6 LOW PLATELET COUNT (HCC): ICD-10-CM

## 2025-06-10 DIAGNOSIS — I48.20 CHRONIC ATRIAL FIBRILLATION (HCC): ICD-10-CM

## 2025-06-10 DIAGNOSIS — I51.7 LEFT VENTRICULAR HYPERTROPHY: ICD-10-CM

## 2025-06-10 DIAGNOSIS — R53.82 CHRONIC FATIGUE: ICD-10-CM

## 2025-06-10 PROBLEM — R07.81 RIB PAIN ON RIGHT SIDE: Status: RESOLVED | Noted: 2024-06-05 | Resolved: 2025-06-10

## 2025-06-10 LAB
ANION GAP SERPL CALC-SCNC: 11 MMOL/L (ref 7–16)
BUN SERPL-MCNC: 12 MG/DL (ref 8–22)
CALCIUM SERPL-MCNC: 9.1 MG/DL (ref 8.5–10.5)
CHLORIDE SERPL-SCNC: 106 MMOL/L (ref 96–112)
CHOLEST SERPL-MCNC: 119 MG/DL (ref 100–199)
CO2 SERPL-SCNC: 23 MMOL/L (ref 20–33)
CREAT SERPL-MCNC: 0.85 MG/DL (ref 0.5–1.4)
FASTING STATUS PATIENT QL REPORTED: NORMAL
GFR SERPLBLD CREATININE-BSD FMLA CKD-EPI: 96 ML/MIN/1.73 M 2
GLUCOSE SERPL-MCNC: 93 MG/DL (ref 65–99)
HDLC SERPL-MCNC: 48 MG/DL
LDLC SERPL CALC-MCNC: 43 MG/DL
POTASSIUM SERPL-SCNC: 4.3 MMOL/L (ref 3.6–5.5)
SODIUM SERPL-SCNC: 140 MMOL/L (ref 135–145)
TRIGL SERPL-MCNC: 139 MG/DL (ref 0–149)

## 2025-06-10 PROCEDURE — 3074F SYST BP LT 130 MM HG: CPT | Performed by: PHYSICIAN ASSISTANT

## 2025-06-10 PROCEDURE — 3078F DIAST BP <80 MM HG: CPT | Performed by: PHYSICIAN ASSISTANT

## 2025-06-10 PROCEDURE — G2211 COMPLEX E/M VISIT ADD ON: HCPCS | Performed by: PHYSICIAN ASSISTANT

## 2025-06-10 PROCEDURE — 99214 OFFICE O/P EST MOD 30 MIN: CPT | Performed by: PHYSICIAN ASSISTANT

## 2025-06-10 PROCEDURE — 80048 BASIC METABOLIC PNL TOTAL CA: CPT

## 2025-06-10 PROCEDURE — 36415 COLL VENOUS BLD VENIPUNCTURE: CPT

## 2025-06-10 PROCEDURE — 83521 IG LIGHT CHAINS FREE EACH: CPT

## 2025-06-10 PROCEDURE — 80061 LIPID PANEL: CPT

## 2025-06-10 RX ORDER — TIRZEPATIDE 15 MG/.5ML
INJECTION, SOLUTION SUBCUTANEOUS
COMMUNITY

## 2025-06-10 ASSESSMENT — FIBROSIS 4 INDEX: FIB4 SCORE: 4.44

## 2025-06-10 ASSESSMENT — PATIENT HEALTH QUESTIONNAIRE - PHQ9: CLINICAL INTERPRETATION OF PHQ2 SCORE: 0

## 2025-06-10 NOTE — PROGRESS NOTES
"Subjective:     History of Present Illness  The patient presents for evaluation of atrial fibrillation, coronary artery disease, weight management, and health maintenance.    He reports a general state of well-being, with his atrial fibrillation being effectively managed. His current medication regimen includes Eliquis, amiodarone, metoprolol, and Lipitor. He underwent laboratory tests earlier today. A stress test was also conducted, which yielded normal results. He has expressed interest in having his testosterone levels checked. He has received the shingles vaccine within the past 3 years and experienced a reaction to the influenza vaccine during the last season. He has a history of prostatitis but reports no current issues.    He has achieved a weight loss of 20 pounds through the use of tirzepatide in compounding form, which he administers via self-injection at a dosage of 15 mg. This medication is compounded with vitamin B12 and is obtained from a compound pharmacy. He reports a decrease in appetite and alcohol consumption since starting this medication. He recently joined a gym and plans to incorporate treadmill exercises and weight training into his routine.    He has an upcoming appointment with dermatology, scheduled either for this month or the next, as part of his biannual check-ups.      Current medicines (including changes today)  Current Medications[1]  He  has no past medical history on file.    ROS   No chest pain, no shortness of breath, no abdominal pain  Positive ROS as per HPI.  All other systems reviewed and are negative.     Objective:     /50 (BP Location: Right arm, Patient Position: Sitting, BP Cuff Size: Adult)   Pulse 64   Temp 36.6 °C (97.8 °F) (Temporal)   Ht 1.88 m (6' 2\")   Wt 113 kg (248 lb 1.6 oz)   SpO2 94%  Body mass index is 31.85 kg/m².   Physical Exam    Constitutional: Alert, no distress.  Skin: Warm, dry, good turgor, no rashes in visible areas.  Eye: Equal, round " and reactive, conjunctiva clear, lids normal.  ENMT: Lips without lesions, good dentition, oropharynx clear.  Neck: Trachea midline, no masses, no thyromegaly.   Psych: Alert and oriented x3, normal affect and mood.      Results  Imaging   - CT scan: CT score was 262, above the 50th percentile.        Assessment and Plan:   The following treatment plan was discussed    Assessment & Plan  1. Atrial Fibrillation. Chronic condition.  - Currently well-managed with Eliquis, amiodarone, and metoprolol.  - No reported issues or complications.  - Continued monitoring of symptoms and medication effectiveness.  - Maintain current medication regimen and follow-up with cardiology.    2. Coronary Artery Disease.  - History of coronary artery disease, currently managed with Lipitor.  - Recent labs performed today; results pending.  - CT score of 262, above the 50th percentile.  - Continue current medication regimen and await lab results.    3. Weight Management. Obesity.  - On tirzepatide 15 mg compounded with vitamin B12.  - Reports significant weight loss and reduced food intake.  - Effective in managing weight; no adverse effects reported.  - Continue current regimen.  BMI currently at 31.85.  Improvement noted.    4. Health Maintenance.  - Received shingles vaccine series within the last 3 years.  - Had a flu shot last season with mild reaction.  - Advised to consider Tdap vaccine, recommended every 10 years.  - Non-fasting labs including complete blood count, testosterone, and PSA to be ordered; testosterone levels to be checked between 7 and 9 AM.      ORDERS:  1. Encounter for screening prostate specific antigen (PSA) measurement    - PROSTATE SPECIFIC AG SCREENING; Future    2. Chronic atrial fibrillation (HCC)      3. Chronic fatigue    - CBC WITHOUT DIFFERENTIAL; Future  - Testosterone, Free & Total, Adult Male (w/SHBG); Future    4. Low platelet count (HCC)    - CBC WITHOUT DIFFERENTIAL; Future        Please note that  this dictation was created using voice recognition software. I have made every reasonable attempt to correct obvious errors, but I expect that there are errors of grammar and possibly content that I did not discover before finalizing the note.      Attestation      Verbal consent was acquired by the patient to use [employer] ambient listening note generation during this visit Yes                  [1]   Current Outpatient Medications   Medication Sig Dispense Refill    Tirzepatide-Weight Management (ZEPBOUND) 15 MG/0.5ML Solution Auto-injector Inject  under the skin. Solution.      metoprolol SR (TOPROL XL) 100 MG TABLET SR 24 HR Take 1 Tablet by mouth 2 times a day. 180 Tablet 0    atorvastatin (LIPITOR) 40 MG Tab Take 1 Tablet by mouth every evening. 100 Tablet 4    ELIQUIS 5 MG Tab Take 5 mg by mouth 2 times a day.      azelastine (ASTELIN) 137 MCG/SPRAY nasal spray Administer 2 Sprays into affected nostril(S) 2 times a day.      fluticasone (FLONASE) 50 MCG/ACT nasal spray Administer 1 Spray into affected nostril(S) 2 times a day.      amiodarone (CORDARONE) 200 MG Tab Take 100 mg by mouth every Monday, Wednesday, and Friday. .5 tab = 100 mg  Indications: Atrial Fibrillation       No current facility-administered medications for this visit.

## 2025-06-11 ENCOUNTER — RESULTS FOLLOW-UP (OUTPATIENT)
Dept: CARDIOLOGY | Facility: MEDICAL CENTER | Age: 65
End: 2025-06-11

## 2025-06-14 LAB
KAPPA LC FREE SER-MCNC: 18.76 MG/L (ref 3.3–19.4)
KAPPA LC FREE/LAMBDA FREE SER NEPH: 1.47 {RATIO} (ref 0.26–1.65)
LAMBDA LC FREE SERPL-MCNC: 12.73 MG/L (ref 5.71–26.3)

## 2025-06-27 ENCOUNTER — TELEPHONE (OUTPATIENT)
Dept: HEALTH INFORMATION MANAGEMENT | Facility: OTHER | Age: 65
End: 2025-06-27
Payer: MEDICARE

## 2025-07-08 ENCOUNTER — APPOINTMENT (OUTPATIENT)
Dept: URBAN - METROPOLITAN AREA CLINIC 4 | Facility: CLINIC | Age: 65
Setting detail: DERMATOLOGY
End: 2025-07-08

## 2025-07-08 DIAGNOSIS — L57.0 ACTINIC KERATOSIS: ICD-10-CM

## 2025-07-08 DIAGNOSIS — L81.4 OTHER MELANIN HYPERPIGMENTATION: ICD-10-CM

## 2025-07-08 DIAGNOSIS — L82.1 OTHER SEBORRHEIC KERATOSIS: ICD-10-CM

## 2025-07-08 DIAGNOSIS — D22 MELANOCYTIC NEVI: ICD-10-CM

## 2025-07-08 DIAGNOSIS — Z71.89 OTHER SPECIFIED COUNSELING: ICD-10-CM

## 2025-07-08 DIAGNOSIS — D18.0 HEMANGIOMA: ICD-10-CM

## 2025-07-08 DIAGNOSIS — Z85.828 PERSONAL HISTORY OF OTHER MALIGNANT NEOPLASM OF SKIN: ICD-10-CM

## 2025-07-08 PROBLEM — D18.01 HEMANGIOMA OF SKIN AND SUBCUTANEOUS TISSUE: Status: ACTIVE | Noted: 2025-07-08

## 2025-07-08 PROBLEM — D22.5 MELANOCYTIC NEVI OF TRUNK: Status: ACTIVE | Noted: 2025-07-08

## 2025-07-08 PROCEDURE — ? COUNSELING

## 2025-07-08 PROCEDURE — ? DIAGNOSIS COMMENT

## 2025-07-08 PROCEDURE — ? LIQUID NITROGEN

## 2025-07-08 PROCEDURE — ? ADDITIONAL NOTES

## 2025-07-08 ASSESSMENT — LOCATION DETAILED DESCRIPTION DERM
LOCATION DETAILED: RIGHT MEDIAL KNEE
LOCATION DETAILED: RIGHT KNEE
LOCATION DETAILED: LEFT MEDIAL SUPERIOR CHEST
LOCATION DETAILED: RIGHT MEDIAL UPPER BACK
LOCATION DETAILED: LEFT DORSAL FOOT
LOCATION DETAILED: PERIUMBILICAL SKIN
LOCATION DETAILED: UPPER STERNUM
LOCATION DETAILED: RIGHT MEDIAL SUPERIOR CHEST
LOCATION DETAILED: RIGHT ANTERIOR DISTAL THIGH
LOCATION DETAILED: LEFT MEDIAL MALAR CHEEK
LOCATION DETAILED: LEFT ANTERIOR PROXIMAL THIGH
LOCATION DETAILED: LEFT FOREHEAD
LOCATION DETAILED: RIGHT SUPERIOR MEDIAL UPPER BACK

## 2025-07-08 ASSESSMENT — LOCATION SIMPLE DESCRIPTION DERM
LOCATION SIMPLE: RIGHT THIGH
LOCATION SIMPLE: LEFT FOOT
LOCATION SIMPLE: CHEST
LOCATION SIMPLE: LEFT CHEEK
LOCATION SIMPLE: RIGHT UPPER BACK
LOCATION SIMPLE: RIGHT KNEE
LOCATION SIMPLE: ABDOMEN
LOCATION SIMPLE: LEFT THIGH
LOCATION SIMPLE: LEFT FOREHEAD

## 2025-07-08 ASSESSMENT — LOCATION ZONE DERM
LOCATION ZONE: TRUNK
LOCATION ZONE: FACE
LOCATION ZONE: LEG
LOCATION ZONE: FEET

## 2025-07-28 DIAGNOSIS — I48.0 PAROXYSMAL ATRIAL FIBRILLATION (HCC): Primary | ICD-10-CM

## 2025-07-28 RX ORDER — APIXABAN 5 MG/1
5 TABLET, FILM COATED ORAL 2 TIMES DAILY
Qty: 200 TABLET | Refills: 0 | Status: SHIPPED | OUTPATIENT
Start: 2025-07-28

## 2025-07-29 ENCOUNTER — HOSPITAL ENCOUNTER (OUTPATIENT)
Dept: LAB | Facility: MEDICAL CENTER | Age: 65
End: 2025-07-29
Attending: PHYSICIAN ASSISTANT
Payer: MEDICARE

## 2025-07-29 ENCOUNTER — PATIENT MESSAGE (OUTPATIENT)
Dept: CARDIOLOGY | Facility: MEDICAL CENTER | Age: 65
End: 2025-07-29
Payer: MEDICARE

## 2025-07-29 DIAGNOSIS — R53.82 CHRONIC FATIGUE: ICD-10-CM

## 2025-07-29 DIAGNOSIS — Z12.5 ENCOUNTER FOR SCREENING PROSTATE SPECIFIC ANTIGEN (PSA) MEASUREMENT: ICD-10-CM

## 2025-07-29 DIAGNOSIS — D69.6 LOW PLATELET COUNT (HCC): ICD-10-CM

## 2025-07-29 LAB
ERYTHROCYTE [DISTWIDTH] IN BLOOD BY AUTOMATED COUNT: 42.5 FL (ref 35.9–50)
HCT VFR BLD AUTO: 48.5 % (ref 42–52)
HGB BLD-MCNC: 16.7 G/DL (ref 14–18)
MCH RBC QN AUTO: 32.2 PG (ref 27–33)
MCHC RBC AUTO-ENTMCNC: 34.4 G/DL (ref 32.3–36.5)
MCV RBC AUTO: 93.4 FL (ref 81.4–97.8)
PLATELET # BLD AUTO: 146 K/UL (ref 164–446)
PMV BLD AUTO: 10.5 FL (ref 9–12.9)
PSA SERPL DL<=0.01 NG/ML-MCNC: 0.27 NG/ML (ref 0–4)
RBC # BLD AUTO: 5.19 M/UL (ref 4.7–6.1)
WBC # BLD AUTO: 7.8 K/UL (ref 4.8–10.8)

## 2025-07-29 PROCEDURE — 36415 COLL VENOUS BLD VENIPUNCTURE: CPT

## 2025-07-29 PROCEDURE — 84153 ASSAY OF PSA TOTAL: CPT

## 2025-07-29 PROCEDURE — 84270 ASSAY OF SEX HORMONE GLOBUL: CPT

## 2025-07-29 PROCEDURE — 85027 COMPLETE CBC AUTOMATED: CPT

## 2025-07-29 PROCEDURE — 84403 ASSAY OF TOTAL TESTOSTERONE: CPT

## 2025-07-29 PROCEDURE — 84402 ASSAY OF FREE TESTOSTERONE: CPT

## 2025-07-31 PROBLEM — R79.89 ELEVATED TESTOSTERONE LEVEL IN MALE: Status: ACTIVE | Noted: 2025-07-31

## 2025-07-31 LAB
SHBG SERPL-SCNC: 140 NMOL/L (ref 19–76)
TESTOST FREE MFR SERPL: 0.7 % (ref 1.6–2.9)
TESTOST FREE SERPL-MCNC: 71 PG/ML (ref 47–244)
TESTOST SERPL-MCNC: 995 NG/DL (ref 300–720)

## 2025-08-21 ENCOUNTER — OFFICE VISIT (OUTPATIENT)
Dept: CARDIOLOGY | Facility: MEDICAL CENTER | Age: 65
End: 2025-08-21
Attending: NURSE PRACTITIONER
Payer: MEDICARE

## 2025-08-21 VITALS
WEIGHT: 248.2 LBS | HEART RATE: 62 BPM | BODY MASS INDEX: 31.85 KG/M2 | HEIGHT: 74 IN | RESPIRATION RATE: 16 BRPM | OXYGEN SATURATION: 97 % | DIASTOLIC BLOOD PRESSURE: 58 MMHG | SYSTOLIC BLOOD PRESSURE: 116 MMHG

## 2025-08-21 DIAGNOSIS — E66.9 OBESITY (BMI 30-39.9): ICD-10-CM

## 2025-08-21 DIAGNOSIS — I51.7 LEFT VENTRICULAR HYPERTROPHY: ICD-10-CM

## 2025-08-21 DIAGNOSIS — D68.69 HYPERCOAGULABLE STATE DUE TO PAROXYSMAL ATRIAL FIBRILLATION (HCC): Primary | ICD-10-CM

## 2025-08-21 DIAGNOSIS — I48.0 PAROXYSMAL ATRIAL FIBRILLATION (HCC): ICD-10-CM

## 2025-08-21 DIAGNOSIS — I10 HTN (HYPERTENSION), MALIGNANT: ICD-10-CM

## 2025-08-21 DIAGNOSIS — E78.00 PURE HYPERCHOLESTEROLEMIA: ICD-10-CM

## 2025-08-21 DIAGNOSIS — I48.0 HYPERCOAGULABLE STATE DUE TO PAROXYSMAL ATRIAL FIBRILLATION (HCC): Primary | ICD-10-CM

## 2025-08-21 LAB — EKG IMPRESSION: NORMAL

## 2025-08-21 PROCEDURE — 99213 OFFICE O/P EST LOW 20 MIN: CPT | Performed by: NURSE PRACTITIONER

## 2025-08-21 PROCEDURE — 93005 ELECTROCARDIOGRAM TRACING: CPT | Mod: TC | Performed by: NURSE PRACTITIONER

## 2025-08-21 PROCEDURE — 93010 ELECTROCARDIOGRAM REPORT: CPT | Performed by: INTERNAL MEDICINE

## 2025-08-21 RX ORDER — ATORVASTATIN CALCIUM 40 MG/1
40 TABLET, FILM COATED ORAL NIGHTLY
Qty: 100 TABLET | Refills: 4 | Status: SHIPPED | OUTPATIENT
Start: 2025-08-21

## 2025-08-21 RX ORDER — METOPROLOL SUCCINATE 100 MG/1
100 TABLET, EXTENDED RELEASE ORAL 2 TIMES DAILY
Qty: 200 TABLET | Refills: 3 | Status: SHIPPED | OUTPATIENT
Start: 2025-08-21

## 2025-08-21 RX ORDER — AMIODARONE HYDROCHLORIDE 200 MG/1
100 TABLET ORAL
Qty: 45 TABLET | Refills: 3 | Status: SHIPPED | OUTPATIENT
Start: 2025-08-22

## 2025-08-21 RX ORDER — APIXABAN 5 MG/1
5 TABLET, FILM COATED ORAL 2 TIMES DAILY
Qty: 200 TABLET | Refills: 3 | Status: SHIPPED | OUTPATIENT
Start: 2025-08-21

## 2025-08-21 ASSESSMENT — ENCOUNTER SYMPTOMS
COUGH: 0
PALPITATIONS: 0
DIZZINESS: 0
SHORTNESS OF BREATH: 0
FEVER: 0
MYALGIAS: 1
PND: 0
ABDOMINAL PAIN: 0
ROS SKIN COMMENTS: BRUISING
CLAUDICATION: 0
ORTHOPNEA: 0

## 2025-08-21 ASSESSMENT — LIFESTYLE VARIABLES: HOW OFTEN DO YOU HAVE A DRINK CONTAINING ALCOHOL: MONTHLY OR LESS

## 2025-08-21 ASSESSMENT — FIBROSIS 4 INDEX: FIB4 SCORE: 3.58
